# Patient Record
Sex: FEMALE | Race: WHITE | Employment: UNEMPLOYED | URBAN - METROPOLITAN AREA
[De-identification: names, ages, dates, MRNs, and addresses within clinical notes are randomized per-mention and may not be internally consistent; named-entity substitution may affect disease eponyms.]

---

## 2020-10-19 ENCOUNTER — HOSPITAL ENCOUNTER (OUTPATIENT)
Dept: PHYSICAL THERAPY | Age: 35
Discharge: HOME | End: 2020-10-19
Attending: STUDENT IN AN ORGANIZED HEALTH CARE EDUCATION/TRAINING PROGRAM

## 2020-10-19 NOTE — THERAPY EVALUATION
Speech Pathology    Pt arrived for a dysphagia evaluation. Pt reported this date has not been able to swallow solids since she got choked on chicken approximately 6 months ago. She reported on occasion she has been able to consume solids foods but they usually get stuck and won't go up or down. She reported she has to gag or cough to remove them. She reported she has had 3 or 4 EGDs and they have all been normal.  She reported she is consuming Ensures only since she cannot eat solid foods. She reported she does take her medication in applesauce. Per MD orders, pt was scheduled for ST with FEES. However, we do not have the equipment to do FEES with [address] [place] [address]. Noted MBS results completed at [facility] 8/3/2020 were as follows: \"Pt presents with oropharyngeal swallow within normal limits. Pt demonstrated adequate labial receipt, bolus containment, and oral clearance. Mastication was mildly prolonged and AP transit was defined by repetitive lingual movement. This appeared to be behavioral versus physiologic as no oral motor weakness was observed. No episodes of airway compromise observed. No significant pharyngeal residue present with any consistency. Trace retention observed in the proximal esophagus on lateral view. Using images from study, educated pt on results and recommendations. Following completion of study, pt reported right-sided globus sensation in her throat. Showed pt that there was no pharyngeal residue (trace amount of residual in proximal esophagus). Educated pt that her pharyngeal swallow function is within normal limits without any evidence of physiologic abnormality. Recommended pt work on increasing solid food textures to work back toward regular diet. Pt stated she will definitely choke on regular textures, and therefore, will not attempt to eat them. Discussed recommendation for FEES and ENT consult for direct visualization of pharynx. Pt verbalized understanding.  \"    A bedside swallowing evaluation was not completed this date as it was not felt to be indicated due to pt having a MBS that was [facility]. Also, pt reported no difficulties with water and she declined solids due to fear of getting choked. Recommend following up with a FEES as was recommended by Michelle. Called and left Dr. Mariel Selby a message to inform him [facility] does not complete FEES. Pending FEES assessment, pt may benefit from dysphagia therapy. Please re-consult if indicated.     [address] [facility] [address] CCC-SLP

## 2020-10-21 NOTE — PROGRESS NOTES
Speech Pathology  Spoke with Dr. Antonio Cerda this date whom reported he wanted the pt seen for po trials and diet advancement vs for a FEES. Informed him I thought he wanted the FEES. Recommend a FEES since that was recommended after the MBS. Informed him I could f/u after the FEES. He reported he is proceeding with the FEES. JOSEFINA Bonner, CCC-SLP    ADD:  I spoke with pt this date. She reported she was told they are proceeding with FEES. Instructed her to call back after that has been completed and we can complete a bedside and work on po trial advancement. Understanding expressed.     Natividad Bonner [address] CCC-SLP

## 2020-11-30 ENCOUNTER — HOSPITAL ENCOUNTER (OUTPATIENT)
Dept: GENERAL RADIOLOGY | Age: 35
Discharge: HOME | End: 2020-11-30
Attending: STUDENT IN AN ORGANIZED HEALTH CARE EDUCATION/TRAINING PROGRAM
Payer: COMMERCIAL

## 2020-11-30 DIAGNOSIS — R13.12 OROPHARYNGEAL DYSPHAGIA: ICD-10-CM

## 2020-11-30 PROCEDURE — 74011000250 HC RX REV CODE- 250: Performed by: STUDENT IN AN ORGANIZED HEALTH CARE EDUCATION/TRAINING PROGRAM

## 2020-11-30 PROCEDURE — 74011000255 HC RX REV CODE- 255: Performed by: STUDENT IN AN ORGANIZED HEALTH CARE EDUCATION/TRAINING PROGRAM

## 2020-11-30 PROCEDURE — 74220 X-RAY XM ESOPHAGUS 1CNTRST: CPT

## 2020-11-30 RX ADMIN — BARIUM SULFATE 700 MG: 700 TABLET ORAL at 11:32

## 2020-11-30 RX ADMIN — ANTACID/ANTIFLATULENT 4 G: 380; 550; 10; 10 GRANULE, EFFERVESCENT ORAL at 11:31

## 2020-11-30 RX ADMIN — BARIUM SULFATE 355 ML: 0.6 SUSPENSION ORAL at 11:31

## 2020-11-30 RX ADMIN — BARIUM SULFATE 135 ML: 980 POWDER, FOR SUSPENSION ORAL at 11:31

## 2020-12-30 VITALS — HEIGHT: 63 IN | WEIGHT: 160 LBS | BODY MASS INDEX: 28.35 KG/M2

## 2020-12-30 RX ORDER — ETONOGESTREL AND ETHINYL ESTRADIOL 11.7; 2.7 MG/1; MG/1
INSERT, EXTENDED RELEASE VAGINAL
COMMUNITY

## 2020-12-30 NOTE — PERIOP NOTES
Patient verified name and . Order for consent found in EHR and matches case posting; patient verifies procedure. Tonsillectomy, Flexible Esophagoscopy with [facility]. Type 1B surgery, PAT phone assessment complete. Orders received. Labs per surgeon: none  Labs per anesthesia protocol: none    Patient answered medical/surgical history questions at their best of ability. All prior to admission medications documented in [facility]. Patient instructed to take the following medications the day of surgery according to anesthesia guidelines with a small sip of water: flonase and albuterol inhaler (bring). Hold all vitamins 7 days prior to surgery and NSAIDS 5 days prior to surgery. Patient instructed on the following:    > a negative Covid swab result is required to proceed with surgery;  appointments are made by the surgeon office and test should be collected 7 days prior to surgery. The testing center is located at the [address] [place] [address].   > 1 visitor allowed at this time. >Arrive at [facility], time of arrival to be called the day before by 1700  >NPO after midnight including gum, mints, and ice chips  >Responsible adult must drive patient to the hospital, stay during surgery, and patient will need supervision 24 hours after anesthesia  >Use antibacterial soap in shower the night before surgery and on the morning of surgery  >All piercings must be removed prior to arrival.    >Leave all valuables (money and jewelry) at home but bring insurance card and ID on  DOS. >Do not wear make-up, nail polish, lotions, cologne, perfumes, powders, or oil on skin.

## 2021-01-04 ENCOUNTER — HOSPITAL ENCOUNTER (OUTPATIENT)
Dept: SURGERY | Age: 36
Discharge: HOME | End: 2021-01-04

## 2021-01-12 DIAGNOSIS — J35.1 HYPERTROPHY OF TONSILS: ICD-10-CM

## 2021-01-12 DIAGNOSIS — R13.12 OROPHARYNGEAL DYSPHAGIA: Primary | ICD-10-CM

## 2021-01-29 ENCOUNTER — HOSPITAL ENCOUNTER (OUTPATIENT)
Dept: SURGERY | Age: 36
Discharge: HOME | End: 2021-01-29

## 2021-01-29 VITALS — WEIGHT: 160 LBS | HEIGHT: 63 IN | BODY MASS INDEX: 28.35 KG/M2

## 2021-01-29 NOTE — PERIOP NOTES
Patient verified name and . Previous scheduled surgery cancelled due to positive Covid test on . Tonsillectomy, Flexible Esophagoscopy with Vara Guild Dilation    Order for consent found in EHR and matches case posting; patient verifies procedure. Type 1B surgery, PAT phone assessment complete. Orders received. Labs per surgeon: none. Labs per anesthesia protocol: none    Patient COVID test date 21; Patient did show for the appointment. The testing center is located at the [facility] [address]. If appointment is needed-patient provided telephone number of [phone]. Patient answered medical/surgical history questions at their best of ability. All prior to admission medications documented in [facility] Care. Patient instructed to take the following medications the day of surgery according to anesthesia guidelines with a small sip of water: albuterol inhaler (bring) and flonase. Hold all vitamins 7 days prior to surgery and NSAIDS 5 days prior to surgery. Patient instructed on the following:      > 1 medical  is allowed at this time. > Arrive at [facility], time of arrival to be called the day before by 1700  > NPO after midnight including gum, mints, and ice chips  > Responsible adult must drive patient to the hospital, stay during surgery, and patient will need supervision 24 hours after anesthesia  > Use antibacterial soap in shower the night before surgery and on the morning of surgery  > All piercings must be removed prior to arrival.    > Leave all valuables (money and jewelry) at home but bring insurance card and ID on DOS.   > Do not wear make-up, nail polish, lotions, cologne, perfumes, powders, or oil on skin.

## 2021-02-04 ENCOUNTER — ANESTHESIA EVENT (OUTPATIENT)
Dept: SURGERY | Age: 36
End: 2021-02-04
Payer: COMMERCIAL

## 2021-02-05 ENCOUNTER — HOSPITAL ENCOUNTER (OUTPATIENT)
Age: 36
Setting detail: OUTPATIENT SURGERY
Discharge: HOME | End: 2021-02-05
Attending: STUDENT IN AN ORGANIZED HEALTH CARE EDUCATION/TRAINING PROGRAM | Admitting: STUDENT IN AN ORGANIZED HEALTH CARE EDUCATION/TRAINING PROGRAM
Payer: COMMERCIAL

## 2021-02-05 ENCOUNTER — ANESTHESIA (OUTPATIENT)
Dept: SURGERY | Age: 36
End: 2021-02-05
Payer: COMMERCIAL

## 2021-02-05 VITALS
OXYGEN SATURATION: 98 % | DIASTOLIC BLOOD PRESSURE: 72 MMHG | RESPIRATION RATE: 16 BRPM | HEIGHT: 62 IN | HEART RATE: 76 BPM | TEMPERATURE: 98.6 F | WEIGHT: 170 LBS | BODY MASS INDEX: 31.28 KG/M2 | SYSTOLIC BLOOD PRESSURE: 115 MMHG

## 2021-02-05 DIAGNOSIS — J35.1 HYPERTROPHY OF TONSILS: ICD-10-CM

## 2021-02-05 DIAGNOSIS — J35.1 HYPERTROPHY OF TONSILS ALONE: ICD-10-CM

## 2021-02-05 DIAGNOSIS — R13.12 OROPHARYNGEAL DYSPHAGIA: ICD-10-CM

## 2021-02-05 LAB — HCG UR QL: NEGATIVE

## 2021-02-05 PROCEDURE — 43450 DILATE ESOPHAGUS 1/MULT PASS: CPT | Performed by: STUDENT IN AN ORGANIZED HEALTH CARE EDUCATION/TRAINING PROGRAM

## 2021-02-05 PROCEDURE — 77030039425 HC BLD LARYNG TRULITE DISP TELE -A: Performed by: ANESTHESIOLOGY

## 2021-02-05 PROCEDURE — 2709999900 HC NON-CHARGEABLE SUPPLY: Performed by: STUDENT IN AN ORGANIZED HEALTH CARE EDUCATION/TRAINING PROGRAM

## 2021-02-05 PROCEDURE — 43235 EGD DIAGNOSTIC BRUSH WASH: CPT | Performed by: STUDENT IN AN ORGANIZED HEALTH CARE EDUCATION/TRAINING PROGRAM

## 2021-02-05 PROCEDURE — 77030008703 HC TU ET UNCUF COVD -A: Performed by: ANESTHESIOLOGY

## 2021-02-05 PROCEDURE — 74011000250 HC RX REV CODE- 250: Performed by: STUDENT IN AN ORGANIZED HEALTH CARE EDUCATION/TRAINING PROGRAM

## 2021-02-05 PROCEDURE — 74011250636 HC RX REV CODE- 250/636: Performed by: ANESTHESIOLOGY

## 2021-02-05 PROCEDURE — 74011250636 HC RX REV CODE- 250/636: Performed by: NURSE ANESTHETIST, CERTIFIED REGISTERED

## 2021-02-05 PROCEDURE — 74011000250 HC RX REV CODE- 250: Performed by: NURSE ANESTHETIST, CERTIFIED REGISTERED

## 2021-02-05 PROCEDURE — 81025 URINE PREGNANCY TEST: CPT

## 2021-02-05 PROCEDURE — 74011250637 HC RX REV CODE- 250/637: Performed by: ANESTHESIOLOGY

## 2021-02-05 PROCEDURE — 88305 TISSUE EXAM BY PATHOLOGIST: CPT

## 2021-02-05 PROCEDURE — 77030040922 HC BLNKT HYPOTHRM STRY -A: Performed by: ANESTHESIOLOGY

## 2021-02-05 PROCEDURE — 76210000006 HC OR PH I REC 0.5 TO 1 HR: Performed by: STUDENT IN AN ORGANIZED HEALTH CARE EDUCATION/TRAINING PROGRAM

## 2021-02-05 PROCEDURE — 77030012840 HC ELECTRD COAG SUC CNMD -C: Performed by: STUDENT IN AN ORGANIZED HEALTH CARE EDUCATION/TRAINING PROGRAM

## 2021-02-05 PROCEDURE — 77030011267 HC ELECTRD BLD COVD -A: Performed by: STUDENT IN AN ORGANIZED HEALTH CARE EDUCATION/TRAINING PROGRAM

## 2021-02-05 PROCEDURE — 74011250637 HC RX REV CODE- 250/637: Performed by: STUDENT IN AN ORGANIZED HEALTH CARE EDUCATION/TRAINING PROGRAM

## 2021-02-05 PROCEDURE — 77030008477 HC STYL SATN SLP COVD -A: Performed by: ANESTHESIOLOGY

## 2021-02-05 PROCEDURE — 76210000020 HC REC RM PH II FIRST 0.5 HR: Performed by: STUDENT IN AN ORGANIZED HEALTH CARE EDUCATION/TRAINING PROGRAM

## 2021-02-05 PROCEDURE — 42826 REMOVAL OF TONSILS: CPT | Performed by: STUDENT IN AN ORGANIZED HEALTH CARE EDUCATION/TRAINING PROGRAM

## 2021-02-05 PROCEDURE — 76060000033 HC ANESTHESIA 1 TO 1.5 HR: Performed by: STUDENT IN AN ORGANIZED HEALTH CARE EDUCATION/TRAINING PROGRAM

## 2021-02-05 PROCEDURE — 76010000149 HC OR TIME 1 TO 1.5 HR: Performed by: STUDENT IN AN ORGANIZED HEALTH CARE EDUCATION/TRAINING PROGRAM

## 2021-02-05 RX ORDER — LIDOCAINE HYDROCHLORIDE AND EPINEPHRINE 20; 10 MG/ML; UG/ML
INJECTION, SOLUTION INFILTRATION; PERINEURAL AS NEEDED
Status: DISCONTINUED | OUTPATIENT
Start: 2021-02-05 | End: 2021-02-05 | Stop reason: HOSPADM

## 2021-02-05 RX ORDER — ACETAMINOPHEN 500 MG
1000 TABLET ORAL ONCE
Status: DISCONTINUED | OUTPATIENT
Start: 2021-02-05 | End: 2021-02-05 | Stop reason: HOSPADM

## 2021-02-05 RX ORDER — LIDOCAINE HYDROCHLORIDE 10 MG/ML
0.1 INJECTION INFILTRATION; PERINEURAL AS NEEDED
Status: DISCONTINUED | OUTPATIENT
Start: 2021-02-05 | End: 2021-02-05 | Stop reason: HOSPADM

## 2021-02-05 RX ORDER — FENTANYL CITRATE 50 UG/ML
100 INJECTION, SOLUTION INTRAMUSCULAR; INTRAVENOUS AS NEEDED
Status: DISCONTINUED | OUTPATIENT
Start: 2021-02-05 | End: 2021-02-05 | Stop reason: HOSPADM

## 2021-02-05 RX ORDER — PROPOFOL 10 MG/ML
INJECTION, EMULSION INTRAVENOUS AS NEEDED
Status: DISCONTINUED | OUTPATIENT
Start: 2021-02-05 | End: 2021-02-05 | Stop reason: HOSPADM

## 2021-02-05 RX ORDER — ONDANSETRON 2 MG/ML
INJECTION INTRAMUSCULAR; INTRAVENOUS AS NEEDED
Status: DISCONTINUED | OUTPATIENT
Start: 2021-02-05 | End: 2021-02-05 | Stop reason: HOSPADM

## 2021-02-05 RX ORDER — ROCURONIUM BROMIDE 10 MG/ML
INJECTION, SOLUTION INTRAVENOUS AS NEEDED
Status: DISCONTINUED | OUTPATIENT
Start: 2021-02-05 | End: 2021-02-05 | Stop reason: HOSPADM

## 2021-02-05 RX ORDER — APREPITANT 40 MG/1
40 CAPSULE ORAL ONCE
Status: DISCONTINUED | OUTPATIENT
Start: 2021-02-05 | End: 2021-02-05 | Stop reason: HOSPADM

## 2021-02-05 RX ORDER — FENTANYL CITRATE 50 UG/ML
INJECTION, SOLUTION INTRAMUSCULAR; INTRAVENOUS AS NEEDED
Status: DISCONTINUED | OUTPATIENT
Start: 2021-02-05 | End: 2021-02-05 | Stop reason: HOSPADM

## 2021-02-05 RX ORDER — DIPHENHYDRAMINE HYDROCHLORIDE 50 MG/ML
12.5 INJECTION, SOLUTION INTRAMUSCULAR; INTRAVENOUS
Status: DISCONTINUED | OUTPATIENT
Start: 2021-02-05 | End: 2021-02-05 | Stop reason: HOSPADM

## 2021-02-05 RX ORDER — DIPHENHYDRAMINE HYDROCHLORIDE 50 MG/ML
INJECTION, SOLUTION INTRAMUSCULAR; INTRAVENOUS AS NEEDED
Status: DISCONTINUED | OUTPATIENT
Start: 2021-02-05 | End: 2021-02-05 | Stop reason: HOSPADM

## 2021-02-05 RX ORDER — FLUMAZENIL 0.1 MG/ML
0.2 INJECTION INTRAVENOUS
Status: DISCONTINUED | OUTPATIENT
Start: 2021-02-05 | End: 2021-02-05 | Stop reason: HOSPADM

## 2021-02-05 RX ORDER — HYDROMORPHONE HYDROCHLORIDE 2 MG/ML
INJECTION, SOLUTION INTRAMUSCULAR; INTRAVENOUS; SUBCUTANEOUS AS NEEDED
Status: DISCONTINUED | OUTPATIENT
Start: 2021-02-05 | End: 2021-02-05 | Stop reason: HOSPADM

## 2021-02-05 RX ORDER — LIDOCAINE HYDROCHLORIDE 20 MG/ML
INJECTION, SOLUTION EPIDURAL; INFILTRATION; INTRACAUDAL; PERINEURAL AS NEEDED
Status: DISCONTINUED | OUTPATIENT
Start: 2021-02-05 | End: 2021-02-05 | Stop reason: HOSPADM

## 2021-02-05 RX ORDER — MIDAZOLAM HYDROCHLORIDE 1 MG/ML
2 INJECTION, SOLUTION INTRAMUSCULAR; INTRAVENOUS ONCE
Status: COMPLETED | OUTPATIENT
Start: 2021-02-05 | End: 2021-02-05

## 2021-02-05 RX ORDER — OXYCODONE HYDROCHLORIDE 5 MG/1
5 TABLET ORAL
Status: COMPLETED | OUTPATIENT
Start: 2021-02-05 | End: 2021-02-05

## 2021-02-05 RX ORDER — NALOXONE HYDROCHLORIDE 0.4 MG/ML
0.1 INJECTION, SOLUTION INTRAMUSCULAR; INTRAVENOUS; SUBCUTANEOUS AS NEEDED
Status: DISCONTINUED | OUTPATIENT
Start: 2021-02-05 | End: 2021-02-05 | Stop reason: HOSPADM

## 2021-02-05 RX ORDER — HYDROMORPHONE HYDROCHLORIDE 2 MG/ML
0.5 INJECTION, SOLUTION INTRAMUSCULAR; INTRAVENOUS; SUBCUTANEOUS
Status: DISCONTINUED | OUTPATIENT
Start: 2021-02-05 | End: 2021-02-05 | Stop reason: HOSPADM

## 2021-02-05 RX ORDER — GLYCOPYRROLATE 0.2 MG/ML
INJECTION INTRAMUSCULAR; INTRAVENOUS AS NEEDED
Status: DISCONTINUED | OUTPATIENT
Start: 2021-02-05 | End: 2021-02-05 | Stop reason: HOSPADM

## 2021-02-05 RX ORDER — NEOSTIGMINE METHYLSULFATE 1 MG/ML
INJECTION, SOLUTION INTRAVENOUS AS NEEDED
Status: DISCONTINUED | OUTPATIENT
Start: 2021-02-05 | End: 2021-02-05 | Stop reason: HOSPADM

## 2021-02-05 RX ORDER — OXYMETAZOLINE HCL 0.05 %
SPRAY, NON-AEROSOL (ML) NASAL AS NEEDED
Status: DISCONTINUED | OUTPATIENT
Start: 2021-02-05 | End: 2021-02-05 | Stop reason: HOSPADM

## 2021-02-05 RX ORDER — SODIUM CHLORIDE, SODIUM LACTATE, POTASSIUM CHLORIDE, CALCIUM CHLORIDE 600; 310; 30; 20 MG/100ML; MG/100ML; MG/100ML; MG/100ML
75 INJECTION, SOLUTION INTRAVENOUS CONTINUOUS
Status: DISCONTINUED | OUTPATIENT
Start: 2021-02-05 | End: 2021-02-05 | Stop reason: HOSPADM

## 2021-02-05 RX ORDER — DEXAMETHASONE SODIUM PHOSPHATE 4 MG/ML
INJECTION, SOLUTION INTRA-ARTICULAR; INTRALESIONAL; INTRAMUSCULAR; INTRAVENOUS; SOFT TISSUE AS NEEDED
Status: DISCONTINUED | OUTPATIENT
Start: 2021-02-05 | End: 2021-02-05 | Stop reason: HOSPADM

## 2021-02-05 RX ORDER — SODIUM CHLORIDE, SODIUM LACTATE, POTASSIUM CHLORIDE, CALCIUM CHLORIDE 600; 310; 30; 20 MG/100ML; MG/100ML; MG/100ML; MG/100ML
100 INJECTION, SOLUTION INTRAVENOUS CONTINUOUS
Status: DISCONTINUED | OUTPATIENT
Start: 2021-02-05 | End: 2021-02-05 | Stop reason: HOSPADM

## 2021-02-05 RX ADMIN — HYDROMORPHONE HYDROCHLORIDE 1 MG: 2 INJECTION INTRAMUSCULAR; INTRAVENOUS; SUBCUTANEOUS at 10:26

## 2021-02-05 RX ADMIN — DEXAMETHASONE SODIUM PHOSPHATE 10 MG: 4 INJECTION, SOLUTION INTRAMUSCULAR; INTRAVENOUS at 10:28

## 2021-02-05 RX ADMIN — OXYCODONE 5 MG: 5 TABLET ORAL at 12:10

## 2021-02-05 RX ADMIN — HYDROMORPHONE HYDROCHLORIDE 0.5 MG: 2 INJECTION INTRAMUSCULAR; INTRAVENOUS; SUBCUTANEOUS at 11:27

## 2021-02-05 RX ADMIN — Medication 3 MG: at 11:09

## 2021-02-05 RX ADMIN — HYDROMORPHONE HYDROCHLORIDE 0.5 MG: 2 INJECTION INTRAMUSCULAR; INTRAVENOUS; SUBCUTANEOUS at 10:58

## 2021-02-05 RX ADMIN — FENTANYL CITRATE 100 MCG: 50 INJECTION INTRAMUSCULAR; INTRAVENOUS at 10:16

## 2021-02-05 RX ADMIN — HYDROMORPHONE HYDROCHLORIDE 0.5 MG: 2 INJECTION INTRAMUSCULAR; INTRAVENOUS; SUBCUTANEOUS at 11:22

## 2021-02-05 RX ADMIN — ROCURONIUM BROMIDE 30 MG: 10 INJECTION, SOLUTION INTRAVENOUS at 10:17

## 2021-02-05 RX ADMIN — SODIUM CHLORIDE, SODIUM LACTATE, POTASSIUM CHLORIDE, AND CALCIUM CHLORIDE 100 ML/HR: 600; 310; 30; 20 INJECTION, SOLUTION INTRAVENOUS at 08:39

## 2021-02-05 RX ADMIN — PROPOFOL 200 MG: 10 INJECTION, EMULSION INTRAVENOUS at 10:16

## 2021-02-05 RX ADMIN — FENTANYL CITRATE 100 MCG: 50 INJECTION INTRAMUSCULAR; INTRAVENOUS at 10:25

## 2021-02-05 RX ADMIN — HYDROMORPHONE HYDROCHLORIDE 0.5 MG: 2 INJECTION INTRAMUSCULAR; INTRAVENOUS; SUBCUTANEOUS at 11:47

## 2021-02-05 RX ADMIN — GLYCOPYRROLATE 0.4 MG: 0.2 INJECTION, SOLUTION INTRAMUSCULAR; INTRAVENOUS at 11:09

## 2021-02-05 RX ADMIN — ONDANSETRON 4 MG: 2 INJECTION INTRAMUSCULAR; INTRAVENOUS at 11:09

## 2021-02-05 RX ADMIN — MIDAZOLAM 2 MG: 1 INJECTION INTRAMUSCULAR; INTRAVENOUS at 08:39

## 2021-02-05 RX ADMIN — LIDOCAINE HYDROCHLORIDE 80 MG: 20 INJECTION, SOLUTION EPIDURAL; INFILTRATION; INTRACAUDAL; PERINEURAL at 10:16

## 2021-02-05 RX ADMIN — DIPHENHYDRAMINE HYDROCHLORIDE 25 MG: 50 INJECTION, SOLUTION INTRAMUSCULAR; INTRAVENOUS at 10:31

## 2021-02-05 NOTE — PROCEDURES
Operative Report    Patient: Anton Matamoros MRN: [id number]  SSN: [id number]    YOB: 1985  Age: 37 y.o. Sex: female       Date of Surgery: 2/5/2021     Preoperative Diagnosis: Oropharyngeal dysphagia [R13.12]  Tonsillar hypertrophy [J35.1]     Postoperative Diagnosis: Oropharyngeal dysphagia [R13.12]  Tonsillar hypertrophy [J35.1]     Surgeon(s) and Role:     * Alissa Summers MD - Primary    Anesthesia: General     Procedure: Procedure(s):  -TONSILLECTOMY  -ESOPHAGOSCOPY FLEXIBLE WITH Ly Valdes   -EGD    Findings: 2+ endophytic tonsils bilaterally, slightly tight at the region of the cricopharyngeus, esophagus dilated to 62 [place], no other GI abnormalities noted    Procedure in Detail: Patient was identified in preoperative holding area. Informed consent was obtained. The patient was brought back to the operating room suite laid supine on the operating room table. Upper and lower extremity pressure points were padded. SCDs were applied. Anesthesia was induced and the patient was intubated out complication. A preoperative timeout was performed. The patient was turned on degrees counterclockwise away from anesthesia. A tooth guard was inserted. Shoulder roll was placed. #3 Jalaine Chars was used to expose the esophageal inlet. The esophagoscope was then easily passed into the stomach. The stomach was examined retroflexed views were obtained in the lower esophageal sphincter appeared normal.  The duodenum was entered and there were no abnormalities noted. There were no other abnormalities of the stomach noted. Stomach was deflated and the esophagus was examined on the way out including the Z-line which did not show any significant abnormalities. Next I used #3 Jalaine Chars again to expose the esophageal inlet and then passed a 42 [place]sy Fox dilator. I left this place for a minute and then removed it.   I then passed a 62 [place] Fox dilator and left in place for a minute. After removing it I then reexposed the esophagus with the #3 Clois Alvares and passed the EGD scope again. The scope was passed into the stomach which was again suctioned out. The esophagus was examined on the way out. Final superficial tearing at the esophageal inlet that was not full-thickness. The EGD scope was then removed. The tooth guard was removed. I then turned to performing a tonsillectomy and Wyline Crimes was inserted. Rubber catheters passed through the nose out the mouth in order to elevate soft palate. The mouthgag was placed in suspension. I turned to the right tonsil first and grasped with an Allis and retracted medially. Dissected the tonsil free of the anterior posterior tonsillar pillars using the Bovie electrocautery. This was marked with a suture and handed off as a specimen. A tonsil soaked in Afrin were placed in the tonsil bed. Attention was then turned to the left side which was also grasped with an Allis retracted medially dissected free of the anterior posterior tonsillar pillars using the Bovie electrocautery. The specimen was handed off. The oropharynx was irrigated out. Hemostasis was achieved using the suction Bovie electrocautery. The tonsil balls were removed. Patient was then let out of suspension and then resuspended and the tonsillar fossa were examined and there was excellent hemostasis. The esophagus was suctioned out. The patient was turned back to anesthesia awoken taken to PACU in stable condition. Estimated Blood Loss: 25 cc    Tourniquet Time: * No tourniquets in log *      Implants: * No implants in log *            Specimens:   ID Type Source Tests Collected by Time Destination   1 : Tonsils Preservative Tonsil  Guzman Blankenship MD 2/5/2021 1051 Pathology           Drains: None                Complications: None    Counts: Sponge and needle counts were correct times two.     Signed By:  Nini Phillip MD     February 5, 2021

## 2021-02-05 NOTE — ANESTHESIA POSTPROCEDURE EVALUATION
Procedure(s):  TONSILLECTOMY  ESOPHAGOSCOPY FLEXIBLE WITH FOY DILATION.     general    Anesthesia Post Evaluation      Multimodal analgesia: multimodal analgesia used between 6 hours prior to anesthesia start to PACU discharge  Patient location during evaluation: PACU  Patient participation: complete - patient participated  Level of consciousness: awake and alert  Pain management: adequate  Airway patency: patent  Anesthetic complications: no  Cardiovascular status: acceptable  Respiratory status: acceptable  Hydration status: acceptable  Post anesthesia nausea and vomiting:  controlled  Final Post Anesthesia Temperature Assessment:  Normothermia (36.0-37.5 degrees C)      INITIAL Post-op Vital signs:   Vitals Value Taken Time   /72 02/05/21 1220   Temp 37 °C (98.6 °F) 02/05/21 1126   Pulse 76 02/05/21 1220   Resp 16 02/05/21 1220   SpO2 98 % 02/05/21 1220

## 2021-02-05 NOTE — ANESTHESIA PREPROCEDURE EVALUATION
Relevant Problems   No relevant active problems       Anesthetic History   No history of anesthetic complications            Review of Systems / Medical History  Patient summary reviewed and pertinent labs reviewed    Pulmonary            Asthma : well controlled    Comments: Covid back in December - fully recovered and no current complaints    Neuro/Psych         Psychiatric history (anxiety )    Comments: ?seizure vs severe migraine x1 - sounds like she passed out for unknown reason - not on seizure meds and no issues since Cardiovascular  Within defined limits                Exercise tolerance: >4 METS     GI/Hepatic/Renal     GERD           Endo/Other        Obesity     Other Findings            Physical Exam    Airway  Mallampati: II  TM Distance: 4 - 6 cm  Neck ROM: normal range of motion   Mouth opening: Normal     Cardiovascular  Regular rate and rhythm,  S1 and S2 normal,  no murmur, click, rub, or gallop             Dental  No notable dental hx       Pulmonary  Breath sounds clear to auscultation               Abdominal  GI exam deferred       Other Findings            Anesthetic Plan    ASA: 2  Anesthesia type: general  ETT        Induction: Intravenous  Anesthetic plan and risks discussed with: Patient and Mother

## 2021-02-05 NOTE — DISCHARGE INSTRUCTIONS
Tonsillectomy: What to Expect at Home  Your Recovery  A tonsillectomy is surgery to remove the tonsils. Sometimes the adenoids are removed during the same surgery. The tonsils and adenoids are in the throat. Your doctor did the surgery through your mouth. Most adults have a lot of throat pain for 1 to 2 weeks or longer. The pain may get worse before it gets better. The pain in your throat can also make your ears hurt. You may have good days and bad days. Most people find that they have the most pain in the first 8 days. You probably will feel tired for 1 to 2 weeks. You may have bad breath for up to 2 weeks. You may be able to go back to work or your usual routine in 1 to 2 weeks. There will be a white coating in your throat where the tonsils were. The coating is like a scab. It usually starts to come off in 5 to 10 days. It is usually gone in 10 to 16 days. You may see some blood in your spit as the coating comes off. After surgery, you may snore or breathe through your mouth at night. This usually gets better 1 to 2 weeks after surgery. Mouth breathing can make your mouth and throat dry or sore. Place a humidifier by your bed when you sleep. This may make it easier for you to breathe. Follow the directions for cleaning the machine. At first, your voice may sound different. Your voice probably will get back to normal in 2 to 6 weeks. It's common for people to lose weight after this surgery. That's because it can hurt to swallow food at first. As long as you drink plenty of liquids, this is okay. You will probably gain the weight back when you can eat normally again. This care sheet gives you a general idea about how long it will take for you to recover. But each person recovers at a different pace. Follow the steps below to get better as quickly as possible. How can you care for yourself at home? Activity    · Rest when you feel tired.  Getting enough sleep will help you recover.     · Try to walk each day. Start by walking a little more than you did the day before. Bit by bit, increase the amount you walk. Walking boosts blood flow and helps prevent pneumonia and constipation.     · Avoid strenuous activities, such as bicycle riding, jogging, weight lifting, or aerobic exercise, for 2 weeks or until your doctor says it is okay.     · For 2 weeks, avoid lifting anything that would make you strain. This may include a child, heavy grocery bags and milk containers, a heavy briefcase or backpack, cat litter or dog food bags, or a vacuum .     · Avoid dirt, dust, and heat for 2 weeks after surgery. These things can irritate your throat.     · For about 1 week, try to avoid crowds or people who you know have a cold or the flu. This can help prevent you from getting an infection.     · You may bathe as usual.     · Ask your doctor when you can drive again.     · You will probably need to take 1 to 2 weeks off from work. It depends on the type of work you do and how you feel. Diet    · Drink plenty of fluids to avoid becoming dehydrated.     · If it is painful to swallow, start out with Popsicles, ice cream, or cold or room-temperature drinks. Do not eat or drink red food items, such as red juice or red Jell-O. The color may make you think you are bleeding. Avoid hot drinks, soda pop, orange or tomato juice, and other acidic foods that can sting the throat. These may make throat pain worse and cause bleeding.     · For 2 weeks, choose soft foods like pudding, yogurt, canned or cooked fruit, scrambled eggs, and mashed potatoes. Avoid eating hard or scratchy foods like chips or raw vegetables.     · You may notice that your bowel movements are not regular right after your surgery. This is common. Try to avoid constipation and straining with bowel movements. You may want to take a fiber supplement every day. If you have not had a bowel movement after a couple of days, ask your doctor about taking a mild laxative. Medicines    · Your doctor will tell you if and when you can restart your medicines. He or she will also give you instructions about taking any new medicines.     · If you take aspirin or some other blood thinner, ask your doctor if and when to start taking it again. Make sure that you understand exactly what your doctor wants you to do.     · Be safe with medicines. Take pain medicines exactly as directed. ? If the doctor gave you a prescription medicine for pain, take it as prescribed. ? If you are not taking a prescription pain medicine, ask your doctor if you can take an over-the-counter medicine.     · If you think your pain medicine is making you sick to your stomach:  ? Take your pain medicine after meals (unless your doctor has told you not to). ? Ask your doctor for a different pain medicine.     · If your doctor prescribed antibiotics, take them as directed. Do not stop taking them just because you feel better. You need to take the full course of antibiotics. Follow-up care is a key part of your treatment and safety. Be sure to make and go to all appointments, and call your doctor if you are having problems. It's also a good idea to know your test results and keep a list of the medicines you take. When should you call for help? Call 911 anytime you think you may need emergency care. For example, call if:    · You passed out (lost consciousness).     · You have severe trouble breathing.     · You have a lot of bleeding. Call your doctor now or seek immediate medical care if:    · You have signs of infection, such as:  ? Increased pain, swelling, warmth, or redness. ? Red streaks leading from the area. ? Pus draining from the area. ? A fever.     · You are bleeding.     · You are too sick to your stomach to drink any fluids.     · You cannot keep down fluids.     · You have new pain, or your pain gets worse.    Watch closely for changes in your health, and be sure to contact your doctor if:    · You do not get better as expected. Where can you learn more? Go to http://www.gray.com/  Enter X297 in the search box to learn more about \"Tonsillectomy: What to Expect at Home. \"  Current as of: April 15, 2020               Content Version: 12.6  © 1741-2627 [employer]. Care instructions adapted under license by [employer] (which disclaims liability or warranty for this information). If you have questions about a medical condition or this instruction, always ask your healthcare professional. [address] any warranty or liability for your use of this information. After general anesthesia or intravenous sedation, for 24 hours or while taking prescription Narcotics:  · Limit your activities  · A responsible adult needs to be with you for the next 24 hours  · Do not drive and operate hazardous machinery  · Do not make important personal or business decisions  · Do not drink alcoholic beverages  · If you have not urinated within 8 hours after discharge, and you are experiencing discomfort from urinary retention, please go to the nearest ED. · If you have sleep apnea and have a CPAP machine, please use it for all naps and sleeping. · Please use caution when taking narcotics and any of your home medications that may cause drowsiness. *  Please give a list of your current medications to your Primary Care Provider. *  Please update this list whenever your medications are discontinued, doses are      changed, or new medications (including over-the-counter products) are added. *  Please carry medication information at all times in case of emergency situations. These are general instructions for a healthy lifestyle:  No smoking/ No tobacco products/ Avoid exposure to second hand smoke  Surgeon General's Warning:  Quitting smoking now greatly reduces serious risk to your health.   Obesity, smoking, and sedentary lifestyle greatly increases your risk for illness  A healthy diet, regular physical exercise & weight monitoring are important for maintaining a healthy lifestyle    You may be retaining fluid if you have a history of heart failure or if you experience any of the following symptoms:  Weight gain of 3 pounds or more overnight or 5 pounds in a week, increased swelling in our hands or feet or shortness of breath while lying flat in bed. Please call your doctor as soon as you notice any of these symptoms; do not wait until your next office visit.

## 2021-03-27 ENCOUNTER — TRANSCRIBE ORDER (OUTPATIENT)
Dept: SCHEDULING | Age: 36
End: 2021-03-27

## 2021-03-27 DIAGNOSIS — M54.42 LUMBAGO WITH SCIATICA, LEFT SIDE: Primary | ICD-10-CM

## 2021-06-24 ENCOUNTER — APPOINTMENT (OUTPATIENT)
Dept: PHYSICAL THERAPY | Age: 36
End: 2021-06-24

## 2021-07-01 ENCOUNTER — HOSPITAL ENCOUNTER (OUTPATIENT)
Dept: PHYSICAL THERAPY | Age: 36
Discharge: HOME | End: 2021-07-01
Payer: COMMERCIAL

## 2021-07-01 PROCEDURE — 97161 PT EVAL LOW COMPLEX 20 MIN: CPT

## 2021-07-01 NOTE — PROGRESS NOTES
Ramakrishna Chin  : 1985  Primary: [facility]  Secondary:  [address] at [facility]  [address], [address], [place]o webber, [address] [place] [address] Street  [phone]   [phone]      OUTPATIENT PHYSICAL THERAPY: Daily Treatment Note 2021    ICD-10: Treatment Diagnosis: lumbago with sciatica, left side (M54.42)                Treatment Diagnosis 2: L knee pain (M25.562)                Treatment Diagnosis 3: other abnormalities of gait and mobility (R26.89)   Precautions: anxiety  Allergies: Morphine and Pantoprazole   TREATMENT PLAN:  Effective Dates: 2021 TO 2021 (60 days). Frequency/Duration: 2 times a week for 60 Day(s) MEDICAL/REFERRING DIAGNOSIS:  Lumbago with sciatica, left side [M54.42]   DATE OF ONSET: patient had a fall in 2018 injuring her back and her knee. Current episode of pain started about 6 months ago  REFERRING PHYSICIAN: Santos Brwone MD MD Orders: Evaluate and Treat   Return MD Appointment: 2021     Pre-treatment Symptoms/Complaints:  Patient with low back and L knee pain hindering her mobility. Pain: Initial: Pain Intensity 1: 4  Pain Location 1: Back  Pain Orientation 1: Lower, Left  Pain Intensity 2: 5  Pain Location 2: Knee  Pain Orientation 2: Left  Post Session:  10 low back- sore   Medications Last Reviewed:  2021  Updated Objective Findings:  See evaluation note from today   TREATMENT:   THERAPEUTIC EXERCISE: ( minutes):  Exercises per grid below to improve mobility, strength, balance and coordination. Required moderate visual, verbal and manual cues to promote proper body alignment, promote proper body posture and promote proper body mechanics. Progressed resistance, range, repetitions and complexity of movement as indicated.      Date:  2021 Date:   Date:     Activity/Exercise Parameters Parameters Parameters   Transverse Abdominus (TA) contraction HEP     Hamstring stretch HEP     Piriformis stretch HEP Scapular retraction HEP                         Time spent with patient reviewing proper muscle recruitment and technique with exercises. MANUAL THERAPY: ( minutes): Joint mobilization, Soft tissue mobilization and Manipulation was utilized and necessary because of the patient's restricted joint motion, painful spasm, loss of articular motion and restricted motion of soft tissue   Supine long axis distraction grade V to L LE with audible cavitation to correct innominate rotation    MODALITIES: (0 minutes):      none today     HEP: As above; handouts given to patient for all exercises. Treatment/Session Summary:    · Response to Treatment:  Patient with good understanding of HEP and plan of care. · Communication/Consultation:  Spoke with patient about plan of care, safety at home, progression of therapy, and importance of HEP  · Equipment provided today:  Patient given handout with above exercises for home  · Recommendations/Intent for next treatment session: Next visit will focus on pain control, manual therapy and modalities as needed, progression of postural and core stability exercises as tolerated.     Total Treatment Billable Duration:  45 minutes: evaluation + HEP  PT Patient Time In/Time Out  Time In: 4174  Time Out: 2892 [facility], PT

## 2021-07-01 NOTE — THERAPY EVALUATION
Matt Dumont  : 1985  Primary: [facility]  Secondary:  [address] at [facility]  [address] [place], [address] [place] [address] Avenue, [place] webber, [address] [place] [address]  [phone]   [phone]       OUTPATIENT PHYSICAL THERAPY:Initial Assessment 2021    ICD-10: Treatment Diagnosis: lumbago with sciatica, left side (M54.42)                Treatment Diagnosis 2: L knee pain (M25.562)                Treatment Diagnosis 3: other abnormalities of gait and mobility (R26.89)   Precautions: anxiety  Allergies: Morphine and Pantoprazole   TREATMENT PLAN:  Effective Dates: 2021 TO 2021 (60 days). Frequency/Duration: 2 times a week for 60 Day(s) MEDICAL/REFERRING DIAGNOSIS:  Lumbago with sciatica, left side [M54.42]   DATE OF ONSET: patient had a fall in 2018 injuring her back and her knee. Current episode of pain started about 6 months ago  REFERRING PHYSICIAN: Theodore Hoffman MD MD Orders: Evaluate and Treat   Return MD Appointment: 2021     INITIAL ASSESSMENT:  Ms. Debra Arias is a 37 y.o. female presenting to physical therapy with complaints of L sided low back and knee pain which is chronic in nature, but the current episode began about 6 months ago. She reports a fall down the stairs in 2018 where she tore her L meniscus and had injections and physical therapy. She reports her L knee buckling and having some falls over the past year. Patient with complaints of L sided low back pain that seems to run into her thigh down to her knee and up to her mid back. She believes that some of her pain is due to needing a breast reduction. She is eager to reduce her pain, sleep without waking at night with pain, perform housework, and return to exercise for weight management.  Patient presents with increased pain, decreased strength, decreased ROM, decreased flexibility, impaired gait, impaired posture, impaired overhead reach, impaired transfer ability, decreased activity tolerance, and overall impaired functional mobility. Patient is a good candidate for skilled physical therapy interventions to include manual therapy, therapeutic exercise, balance training, gait training, transfer training, postural re-education, body mechanics training, and pain modalities as needed. PROBLEM LIST (Impacting functional limitations):  1. Decreased Strength  2. Decreased ADL/Functional Activities  3. Decreased Transfer Abilities  4. Decreased Ambulation Ability/Technique  5. Decreased Balance  6. Increased Pain  7. Decreased Activity Tolerance  8. Decreased Pacing Skills  9. Decreased Work Simplification/Energy Conservation Techniques  10. Decreased Flexibility/Joint Mobility  11. Edema/Girth INTERVENTIONS PLANNED: (Treatment may consist of any combination of the following)  1. Balance Exercise  2. Bed Mobility  3. Cold  4. Cryotherapy  5. Electrical Stimulation  6. Family Education  7. Gait Training  8. Heat  9. Home Exercise Program (HEP)  10. Manual Therapy  11. Neuromuscular Re-education/Strengthening  12. Range of Motion (ROM)  13. Therapeutic Activites  14. Therapeutic Exercise/Strengthening  15. Transfer Training  16. Ultrasound (US)     GOALS: (Goals have been discussed and agreed upon with patient.)  Short-Term Functional Goals: Time Frame: 7/1/2021 to 7/30/2021  1. Patient demonstrates independence with home exercise program without verbal cueing provided by therapist.   2. Patient will report no more than 3/10 low back pain at rest in order to demonstrate improved self pain control and tolerance. 3. Patient will be educated in and demonstrate improved upright posture including decreased anterior head and shoulders to decreased strain on the spine. 4. Patient will be educated in and demonstrate proper squat lift technique in order to reduce stress on bilateral LE and lumbar spine, improve safety, and reduce risk of injury.   5. Patient will be educated in the use of pillows and different positions for sleeping in order to improve comfort and sleep. Discharge Goals: Time Frame: 7/1/2021 to 8/31/2021  1. Patient will improve gross L LE strength to at least 4+/5 in order to improve safety with ambulation and transfers. 2. Patient will report waking no more than 2 times a week due to back pain in order to improve sleeping pattern for overall health and wellness. 3. Patient will report no more than 5/10 back pain with mopping/ lifting activities in order to return to prior independence with house work. 4. Patient will be able to return to a light gym program with no complaints of back pain in order to improve health and weight management. 5. Patient will report minimal to no L knee buckling in order to demonstrate improved stability and safety. 6. Patient will improve Modified Oswestry Scale score to 12/50 from 15/50. Outcome Measure: Tool Used: Modified Oswestry Low Back Pain Questionnaire  Score:  Initial: 15/50  Most Recent: X/50 (Date: -- )   Interpretation of Score: Each section is scored on a 0-5 scale, 5 representing the greatest disability. The scores of each section are added together for a total score of 50. Ambulatory/Rehab Services H2 Model Falls Risk Assessment   Risk Factors:       (1)  Any administered benzodiazepines       (5)  History of Recent Falls [w/in 3 months] Ability to Rise from Chair:       (1)  Pushes up, successful in one attempt   Falls Prevention Plan:       No modifications necessary   Total: (5 or greater = High Risk): 7   ©2010 [facility] [employer]. All Rights Reserved. [facility] [employer] Patent #[id number].  Federal Law prohibits the replication, distribution or use without written permission from [employer] Necessity:   · Patient is expected to demonstrate progress in strength, range of motion, balance, coordination and functional technique to increase independence with self care and house work and improve safety during ambulation, transfers, and stairs. · Skilled intervention continues to be required due to low back pain with L LE symptoms hindering functional mobility. Reason for Services/Other Comments:  · Patient continues to require skilled intervention due to low back and L knee pain hindering mobility and quality of life. Total Evaluation Duration: 45 minutes evaluation + HEP    Rehabilitation Potential For Stated Goals: Good  Regarding Britney Stahl's therapy, I certify that the treatment plan above will be carried out by a therapist or under their direction. Thank you for this referral,  Romel Colvin, PT     Referring Physician Signature: Vijay Carmichael MD _______________________________ Date _____________             PAIN/SUBJECTIVE:    Initial: Pain Intensity 1: 4  Pain Location 1: Back  Pain Orientation 1: Lower, Left  Pain Intensity 2: 5  Pain Location 2: Knee  Pain Orientation 2: Left  Post Session:  5/10 low back- sore    HISTORY:    History of Injury/Illness (Reason for Referral):  Ms. Arabella Alcazar is a 37 y.o. female presenting to physical therapy with complaints of L sided low back and knee pain which is chronic in nature, but the current episode began about 6 months ago. She reports a fall down the stairs in 2018 where she tore her L meniscus and had injections and physical therapy. She reports her L knee buckling and having some falls over the past year. Patient with complaints of L sided low back pain that seems to run into her thigh down to her knee and up to her mid back. She believes that some of her pain is due to needing a breast reduction. She is eager to reduce her pain, sleep without waking at night with pain, perform housework, and return to exercise for weight management.  Patient presents with increased pain, decreased strength, decreased ROM, decreased flexibility, impaired gait, impaired posture, impaired overhead reach, impaired transfer ability, decreased activity tolerance, and overall impaired functional mobility. Past Medical History/Comorbidities:   Ms. Shayan Maloney  has a past medical history of Anxiety, Asthma, GERD (gastroesophageal reflux disease), Reflux gastritis, and Seizures ([facility] [address] (2019). Ms. Shayan Maloney  has a past surgical history that includes hx appendectomy; hx dilation and curettage; and hx heent. Social History/Living Environment:     Patient lives in a private residence with her fiance and children. She has a flight of stairs up to her kids rooms. Prior Level of Function/Work/Activity:  Patient not currently working  Dominant Side:         RIGHT    Current Medications:        Current Outpatient Medications:     ethinyl estradiol-etonogestrel (NuvaRing) 0.12-0.015 mg/24 hr vaginal ring, by Intravaginal route., Disp: , Rfl:     ondansetron (Zofran ODT) 4 mg disintegrating tablet, Take 1 Tab by mouth every eight (8) hours as needed for Nausea or Vomiting., Disp: 8 Tab, Rfl: 0    prednisoLONE (ORAPRED) 15 mg/5 mL (3 mg/mL) solution, Take 5ml by mouth every morning for 5 days, Disp: 30 mL, Rfl: 0    OTHER, Suck on Lollipop 20-30 seconds once every 2-3 hours, Disp: 1 UNSPECIFIED, Rfl: 2    albuterol (PROVENTIL HFA, VENTOLIN HFA, PROAIR HFA) 90 mcg/actuation inhaler, INHALE 1 PUFF BY MOUTH EVERY 4 HOURS AS NEEDED, Disp: , Rfl:     fluticasone propionate (FLONASE) 50 mcg/actuation nasal spray, SPRAY 1 SPRAY INTO EACH NOSTRIL TWICE A DAY, Disp: , Rfl:     temazepam (RESTORIL) 30 mg capsule, TAKE 1 CAPSULE BY MOUTH EVERY DAY AT BEDTIME AS NEEDED, Disp: , Rfl:     LORazepam (ATIVAN) 1 mg tablet, Take 1 mg by mouth as needed. , Disp: , Rfl:     lansoprazole (PREVACID) 30 mg capsule, Take 30 mg by mouth two (2) times a day., Disp: , Rfl:     Date Last Reviewed:  7/1/2021    Number of Personal Factors/Comorbidities that affect the Plan of Care:  Based on chronicity of pain and co-morbidities 1-2: MODERATE COMPLEXITY    EXAMINATION:    Patient denies any LE paresthesia.  Patient denies any increase of symptoms with cough, sneeze or valsalva. Patient denies any saddle paresthesia or bowel/bladder deficits. Observation/Orthostatic Postural Assessment:          Patient with wide base of support, decreased weight shift to L LE, L knee hyperextension, L iliac crest lower than R in standing, mild genu valgus bilaterally. Patient with small frame. Forward head and shoulders with increased thoracic kyphosis, possibly exacerbated the weigh of her larger chest.  Palpation:          Moderate tenderness to palpation of medial L knee. Significant tenderness to palpation of L PSIS, sacrum, and gluteals. Supine leg length assessment with L leg longer than R and possible posterior innominate  ROM:    AROM (degrees)   Lumbar Flexion 60   Lumbar Extension 20     Strength: Motion Tested Left   (*/5) Right  (*/5)   Hip Flexion 4+ 5   Hip Abduction 4- 4+   Knee Extension 4 5   Ankle Dorsiflexion 5 5   Gross core strength 3/5 as observed with transfers and transverse abdominus contraction   Special Tests:          Neural tension tests: Passive straight leg raise (SLR) test is negative bilaterally. Crossed SLR test is negative bilaterally. Slump test is negative bilaterally. Lumbar traction negative for change in symptoms. Supine leg length assessment with L LE longer and possible posterior innominate. Passive Accessory Motion:         Moderate to significant limitation with posterior to anterior mobilization of lumbar spine and sacrum. Neurological Screen:              Myotomes: Key muscle strength testing through bilateral LE is [facility]. Dermatomes: Sensation to light touch for bilateral LE is intact from L1 to S2. Reflexes: Patellar (L3/ L4): 2+ bilaterally                 Achilles (S1/ S2): 2+ bilaterally   Abnormal reflexes: Clonus: negative bilaterally  Functional Mobility:         Gait/Ambulation:  Slow cora, forward trunk lean, decreased stance time on L. Reports L knee buckling at times. Transfers: Moderate use of bilateral UE for sit to stand transfers. Stairs:  Reports doing stairs one at a time at home. Balance:          Sitting balance intact. Standing balance limited. Body Structures Involved:  1. Nerves  2. Bones  3. Joints  4. Muscles  5. Ligaments Body Functions Affected:  1. Sensory/Pain  2. Neuromusculoskeletal  3. Movement Related Activities and Participation Affected:  1. General Tasks and Demands  2. Mobility  3.  Domestic Life    Number of elements (examined above) that affect the Plan of Care: 1-2: LOW COMPLEXITY    CLINICAL PRESENTATION:    Presentation:   Stable and uncomplicated: LOW COMPLEXITY    CLINICAL DECISION MAKING:    Use of outcome tool(s) and clinical judgement create a POC that gives a: Questionable prediction of patient's progress: MODERATE COMPLEXITY

## 2021-07-22 ENCOUNTER — HOSPITAL ENCOUNTER (OUTPATIENT)
Dept: PHYSICAL THERAPY | Age: 36
Discharge: HOME | End: 2021-07-22
Payer: COMMERCIAL

## 2021-07-22 PROCEDURE — 97110 THERAPEUTIC EXERCISES: CPT

## 2021-07-22 NOTE — PROGRESS NOTES
Girish Cardenas  : 1985  Primary: [facility]  Secondary:  [facility] at [facility]  [address], [address], [place], [address]  [phone]   [phone]      OUTPATIENT PHYSICAL THERAPY: Daily Treatment Note 2021    ICD-10: Treatment Diagnosis: lumbago with sciatica, left side (M54.42)                Treatment Diagnosis 2: L knee pain (M25.562)                Treatment Diagnosis 3: other abnormalities of gait and mobility (R26.89)   Precautions: anxiety  Allergies: Morphine and Pantoprazole   TREATMENT PLAN:  Effective Dates: 2021 TO 2021 (60 days). Frequency/Duration: 2 times a week for 60 Day(s) MEDICAL/REFERRING DIAGNOSIS:  Lumbago with sciatica, left side [M54.42]   DATE OF ONSET: patient had a fall in 2018 injuring her back and her knee. Current episode of pain started about 6 months ago  REFERRING PHYSICIAN: Mary Hansen MD MD Orders: Evaluate and Treat   Return MD Appointment: 2021     Pre-treatment Symptoms/Complaints:  Patient reports having a fall the other day over a baby gate and landing on her R knee. Also reports having some increased mid back pain last week, but feeling better now. Pain: Initial: Pain Intensity 1: 6  Pain Location 1: Back  Pain Orientation 1: Lower, Left  Post Session:  5/10   Medications Last Reviewed:  2021  Updated Objective Findings:  Mild edema R knee from fall   TREATMENT:   THERAPEUTIC EXERCISE: ( 40 minutes):  Exercises per grid below to improve mobility, strength, balance and coordination. Required moderate visual, verbal and manual cues to promote proper body alignment, promote proper body posture and promote proper body mechanics. Progressed resistance, range, repetitions and complexity of movement as indicated.      Date:  2021 Date:  2021 Date:     Activity/Exercise Parameters Parameters Parameters   Transverse Abdominus (TA) contraction HEP 10 x 10 seconds Hamstring stretch HEP Strap, 3 x 30 seconds each    Piriformis stretch HEP 3 x 30 seconds each    Scapular retraction HEP 2 x 10    LTR --- 2 x 10    SKTC --- 3 x 30 seconds each    TA hip abduction --- Red, 2 x 10    TA hip adduction --- Pillow, 10 x 10 seconds                  Time spent with patient reviewing proper muscle recruitment and technique with exercises. MANUAL THERAPY: ( minutes): Joint mobilization, Soft tissue mobilization and Manipulation was utilized and necessary because of the patient's restricted joint motion, painful spasm, loss of articular motion and restricted motion of soft tissue   Supine long axis distraction grade V to L LE with audible cavitation to correct innominate rotation    MODALITIES: (15 minutes): *  Electrical Stimulation Therapy (interferrential with 4 pads to gross thoracolumbar region) was provided with intensity adjusted throughout treatment to patient tolerance. patient in supine with heat - performed, but not charged     HEP: As above; handouts given to patient for all exercises. Treatment/Session Summary:    · Response to Treatment:  Patient tolerated exercises well today with decreased low back pain by end of session. .  · Communication/Consultation:  None today  · Equipment provided today:  None today  · Recommendations/Intent for next treatment session: Next visit will focus on pain control, manual therapy and modalities as needed, progression of postural and core stability exercises as tolerated.     Total Treatment Billable Duration:  40 minutes  PT Patient Time In/Time Out  Time In: 1430  Time Out: 610 Kt Ramirez, PT

## 2021-07-27 ENCOUNTER — HOSPITAL ENCOUNTER (OUTPATIENT)
Dept: PHYSICAL THERAPY | Age: 36
Discharge: HOME | End: 2021-07-27
Payer: COMMERCIAL

## 2021-07-27 NOTE — PROGRESS NOTES
[address] at [facility] 7/27/2021      Patient out of town this week and will not be seen.        Norma Chavarria, PT, DPT, OMT-C

## 2021-08-06 ENCOUNTER — HOSPITAL ENCOUNTER (OUTPATIENT)
Dept: PHYSICAL THERAPY | Age: 36
Discharge: HOME | End: 2021-08-06
Payer: COMMERCIAL

## 2021-08-06 ENCOUNTER — APPOINTMENT (OUTPATIENT)
Dept: PHYSICAL THERAPY | Age: 36
End: 2021-08-06
Payer: COMMERCIAL

## 2021-08-06 NOTE — PROGRESS NOTES
[address] at [facility] 8/6/2021      Patient called to cancel today's appointment stating her MD told her to quarantine due to Covid-19 exposure. She is to quarantine until Thursday next week and is scheduled for that day. Plan to call patient for Covid-19 screen prior to visit next week.         Turner Cramer, PT, DPT, OMT-C

## 2021-08-12 ENCOUNTER — HOSPITAL ENCOUNTER (OUTPATIENT)
Dept: PHYSICAL THERAPY | Age: 36
Discharge: HOME | End: 2021-08-12
Payer: COMMERCIAL

## 2021-08-12 PROCEDURE — 97110 THERAPEUTIC EXERCISES: CPT

## 2021-08-12 NOTE — PROGRESS NOTES
Mariposa Eller  : 1985  Primary: [facility]  Secondary:  [facility] at [facility]  [address] [place], [address], [address], [address] [place] [address]  [phone]   [phone]      OUTPATIENT PHYSICAL THERAPY: Daily Treatment Note 2021    ICD-10: Treatment Diagnosis: lumbago with sciatica, left side (M54.42)                Treatment Diagnosis 2: L knee pain (M25.562)                Treatment Diagnosis 3: other abnormalities of gait and mobility (R26.89)   Precautions: anxiety  Allergies: Morphine and Pantoprazole   TREATMENT PLAN:  Effective Dates: 2021 TO 2021 (60 days). Frequency/Duration: 2 times a week for 60 Day(s) MEDICAL/REFERRING DIAGNOSIS:  Lumbago with sciatica, left side [M54.42]   DATE OF ONSET: patient had a fall in 2018 injuring her back and her knee. Current episode of pain started about 6 months ago  REFERRING PHYSICIAN: Coby Snow MD MD Orders: Evaluate and Treat   Return MD Appointment: 2021     Pre-treatment Symptoms/Complaints:  Patient reported less pain in her mid and lower back. Pain: Initial: Pain Intensity 1: 4  Pain Location 1: Back  Pain Orientation 1: Lower, Left  Post Session:  1/10   Medications Last Reviewed:  2021  Updated Objective Findings:  Pt.compliant with all exercises. TREATMENT:   THERAPEUTIC EXERCISE: ( 40 minutes):  Exercises per grid below to improve mobility, strength, balance and coordination. Required moderate visual, verbal and manual cues to promote proper body alignment, promote proper body posture and promote proper body mechanics. Progressed resistance, range, repetitions and complexity of movement as indicated.      Date:  2021 Date:  2021 Date:  21   Activity/Exercise Parameters Parameters Parameters   Transverse Abdominus (TA) contraction HEP 10 x 10 seconds X 10 reps x 10 sec hold    Hamstring stretch HEP Strap, 3 x 30 seconds each Strap 3x30 sec hold each side BLE's Piriformis stretch HEP 3 x 30 seconds each Strap 4x30 sec hold each side BLE's    Scapular retraction HEP 2 x 10 2x10    LTR --- 2 x 10 2x10    SKTC --- 3 x 30 seconds each 3x30 sec hold each   TA hip abduction --- Red, 2 x 10 Red 2x10    TA hip adduction --- Pillow, 10 x 10 seconds Yellow ball x 10 reps x 10 sec hold                  Time spent with patient reviewing proper muscle recruitment and technique with exercises. MANUAL THERAPY: (10 minutes): Joint mobilization, Soft tissue mobilization and Manipulation was utilized and necessary because of the patient's restricted joint motion, painful spasm, loss of articular motion and restricted motion of soft tissue (no charge)   Supine long axis distraction grade V to L LE with audible cavitation to correct innominate rotation    MODALITIES: (10 minutes): *  Electrical Stimulation Therapy (interferrential with 4 pads to gross thoracolumbar region) was provided with intensity adjusted throughout treatment to patient tolerance. patient in supine with heat - (no charge)    HEP: As above; handouts given to patient for all exercises. Treatment/Session Summary:    · Response to Treatment:  Patient tolerated exercises well today with decreased low back pain by end of session. .  · Communication/Consultation:  None today  · Equipment provided today:  None today  · Recommendations/Intent for next treatment session: Next visit will focus on pain control, manual therapy and modalities as needed, progression of postural and core stability exercises as tolerated.     Total Treatment Billable Duration:  40 minutes  PT Patient Time In/Time Out  Time In: 1430  Time Out: Anuradha 45, PTA

## 2021-08-18 ENCOUNTER — HOSPITAL ENCOUNTER (OUTPATIENT)
Dept: PHYSICAL THERAPY | Age: 36
Discharge: HOME | End: 2021-08-18
Payer: COMMERCIAL

## 2021-08-18 PROCEDURE — 97110 THERAPEUTIC EXERCISES: CPT

## 2021-08-18 NOTE — PROGRESS NOTES
Mendez Doc  : 1985  Primary: [facility]  Secondary:  [facility] at [facility]  [address], [address], [place], [address]  [phone]   [phone]      OUTPATIENT PHYSICAL THERAPY: Daily Treatment Note 2021    ICD-10: Treatment Diagnosis: lumbago with sciatica, left side (M54.42)                Treatment Diagnosis 2: L knee pain (M25.562)                Treatment Diagnosis 3: other abnormalities of gait and mobility (R26.89)   Precautions: anxiety  Allergies: Morphine and Pantoprazole   TREATMENT PLAN:  Effective Dates: 2021 TO 2021 (60 days). Frequency/Duration: 2 times a week for 60 Day(s) MEDICAL/REFERRING DIAGNOSIS:  Lumbago with sciatica, left side [M54.42]   DATE OF ONSET: patient had a fall in 2018 injuring her back and her knee. Current episode of pain started about 6 months ago  REFERRING PHYSICIAN: Allie Mejía MD MD Orders: Evaluate and Treat   Return MD Appointment: 2021     Pre-treatment Symptoms/Complaints:  Patient reports feeling better overall, but still having intermittent back pain. Pain: Initial: Pain Intensity 1: 5  Pain Location 1: Back  Pain Orientation 1: Lower, Mid  Post Session:  2/10   Medications Last Reviewed:  2021  Updated Objective Findings:  None Today   TREATMENT:   THERAPEUTIC EXERCISE: (40 minutes):  Exercises per grid below to improve mobility, strength, balance and coordination. Required moderate visual, verbal and manual cues to promote proper body alignment, promote proper body posture and promote proper body mechanics. Progressed resistance, range, repetitions and complexity of movement as indicated.      Date:  2021 Date:  2021 Date:  21   Activity/Exercise Parameters Parameters Parameters   Transverse Abdominus (TA) contraction 10 x 10 seconds 10 x 10 seconds X 10 reps x 10 sec hold    Hamstring stretch Strap, 3 x 30 seconds each Strap, 3 x 30 seconds each Strap 3x30 sec hold each side BLE's    Piriformis stretch 3 x 30 seconds each 3 x 30 seconds each Strap 4x30 sec hold each side BLE's    Scapular retraction HEP 2 x 10 2x10    LTR 2 x 10 2 x 10 2x10    SKTC 3 x 30 seconds each 3 x 30 seconds each 3x30 sec hold each   TA hip abduction Green, 2 x 10 Red, 2 x 10 Red 2x10    TA hip adduction Pillow, 10 x 10 seconds Pillow, 10 x 10 seconds Yellow ball x 10 reps x 10 sec hold    Bridging 2 x 10     Thoracic extension stretch 1/2 foam roll, 10 x 10 seconds       Time spent with patient reviewing proper muscle recruitment and technique with exercises. MANUAL THERAPY: (0 minutes): Joint mobilization, Soft tissue mobilization and Manipulation was utilized and necessary because of the patient's restricted joint motion, painful spasm, loss of articular motion and restricted motion of soft tissue    Supine long axis distraction grade V to L LE with audible cavitation to correct innominate rotation    MODALITIES: (13 minutes): *  Electrical Stimulation Therapy (interferrential with 4 pads to gross thoracolumbar region) was provided with intensity adjusted throughout treatment to patient tolerance. patient in supine with heat - (no charge)    HEP: As above; handouts given to patient for all exercises. Treatment/Session Summary:    · Response to Treatment:  Patient with good performance of exercises. Good tolerance for thoracic rotation and extension stretching with decrease pain at end of session. · Communication/Consultation:  None today  · Equipment provided today:  None today  · Recommendations/Intent for next treatment session: Next visit will focus on pain control, manual therapy and modalities as needed, progression of postural and core stability exercises as tolerated.     Total Treatment Billable Duration:  40 minutes  PT Patient Time In/Time Out  Time In: 1335  Time Out: 1429  Rena Lee, PT

## 2021-09-30 NOTE — THERAPY DISCHARGE
Elsi Espinoza  : 1985  Primary: [facility]  Secondary:  [address] at [place] [address] [place], [address] [place] [address] Avenue, [place], [address] [place] [address]  [phone]   [phone]       OUTPATIENT PHYSICAL THERAPY:Discontinuation Summary 2021    ICD-10: Treatment Diagnosis: lumbago with sciatica, left side (M54.42)                Treatment Diagnosis 2: L knee pain (M25.562)                Treatment Diagnosis 3: other abnormalities of gait and mobility (R26.89)   Precautions: anxiety  Allergies: Morphine and Pantoprazole   TREATMENT PLAN:  Effective Dates: 2021 TO 2021 (60 days). Frequency/Duration: 2 times a week for 60 Day(s) MEDICAL/REFERRING DIAGNOSIS:  Lumbago with sciatica, left side [M54.42]   DATE OF ONSET: patient had a fall in 2018 injuring her back and her knee. Current episode of pain started about 6 months ago  REFERRING PHYSICIAN: Macarena Ambrosio MD MD Orders: Evaluate and Treat   Return MD Appointment: 2021     INITIAL ASSESSMENT:  Ms. Becca Saeed is a 31 y.o. female presenting to physical therapy with complaints of L sided low back and knee pain which is chronic in nature, but the current episode began about 6 months ago. She reports a fall down the stairs in 2018 where she tore her L meniscus and had injections and physical therapy. She reports her L knee buckling and having some falls over the past year. Patient with complaints of L sided low back pain that seems to run into her thigh down to her knee and up to her mid back. She believes that some of her pain is due to needing a breast reduction. She is eager to reduce her pain, sleep without waking at night with pain, perform housework, and return to exercise for weight management.  Patient presents with increased pain, decreased strength, decreased ROM, decreased flexibility, impaired gait, impaired posture, impaired overhead reach, impaired transfer ability, decreased activity tolerance, and overall impaired functional mobility. Patient is a good candidate for skilled physical therapy interventions to include manual therapy, therapeutic exercise, balance training, gait training, transfer training, postural re-education, body mechanics training, and pain modalities as needed. DISCONTINUATION 9/30/2021: Patient was seen for 4 sessions of physical therapy from 7/1/2021 to 8/18/2021. Patient reported feeling some better overall, but still having intermittent back pain. She missed her last scheduled appointment. Patient called to re-schedule, but did not hear back from patient. No goals were met due to patient not returning to therapy. She has no more appointment scheduled and is discharged from plan of care at this time. PROBLEM LIST (Impacting functional limitations):  1. Decreased Strength  2. Decreased ADL/Functional Activities  3. Decreased Transfer Abilities  4. Decreased Ambulation Ability/Technique  5. Decreased Balance  6. Increased Pain  7. Decreased Activity Tolerance  8. Decreased Pacing Skills  9. Decreased Work Simplification/Energy Conservation Techniques  10. Decreased Flexibility/Joint Mobility  11. Edema/Girth INTERVENTIONS PLANNED: (Treatment may consist of any combination of the following)  1. Balance Exercise  2. Bed Mobility  3. Cold  4. Cryotherapy  5. Electrical Stimulation  6. Family Education  7. Gait Training  8. Heat  9. Home Exercise Program (HEP)  10. Manual Therapy  11. Neuromuscular Re-education/Strengthening  12. Range of Motion (ROM)  13. Therapeutic Activites  14. Therapeutic Exercise/Strengthening  15. Transfer Training  16. Ultrasound (US)     GOALS: (Goals have been discussed and agreed upon with patient.)  Short-Term Functional Goals: Time Frame: 7/1/2021 to 7/30/2021  1. Patient demonstrates independence with home exercise program without verbal cueing provided by therapist. -NOT MET  2.  Patient will report no more than 3/10 low back pain at rest in order to demonstrate improved self pain control and tolerance. 3. Patient will be educated in and demonstrate improved upright posture including decreased anterior head and shoulders to decreased strain on the spine. -NOT MET  4. Patient will be educated in and demonstrate proper squat lift technique in order to reduce stress on bilateral LE and lumbar spine, improve safety, and reduce risk of injury. -NOT MET  5. Patient will be educated in the use of pillows and different positions for sleeping in order to improve comfort and sleep. -NOT MET  Discharge Goals: Time Frame: 7/1/2021 to 8/31/2021  1. Patient will improve gross L LE strength to at least 4+/5 in order to improve safety with ambulation and transfers. -NOT MET  2. Patient will report waking no more than 2 times a week due to back pain in order to improve sleeping pattern for overall health and wellness. -NOT MET  3. Patient will report no more than 5/10 back pain with mopping/ lifting activities in order to return to prior independence with house work. -NOT MET  4. Patient will be able to return to a light gym program with no complaints of back pain in order to improve health and weight management. -NOT MET  5. Patient will report minimal to no L knee buckling in order to demonstrate improved stability and safety. -NOT MET  6. Patient will improve Modified Oswestry Scale score to 12/50 from 15/50. -NOT MET    Outcome Measure: Tool Used: Modified Oswestry Low Back Pain Questionnaire  Score:  Initial: 15/50  Most Recent: X/50 (Date: -- )   Interpretation of Score: Each section is scored on a 0-5 scale, 5 representing the greatest disability. The scores of each section are added together for a total score of 50.       Ambulatory/Rehab Services H2 Model Falls Risk Assessment   Risk Factors:       (1)  Any administered benzodiazepines       (5)  History of Recent Falls [w/in 3 months] Ability to Rise from Chair:       (1)  Pushes up, successful in one attempt   Falls Prevention Plan:       No modifications necessary   Total: (5 or greater = High Risk): 7   ©2010 [facility]. All Rights Reserved. [facility] States Patent #[id number]. Federal Law prohibits the replication, distribution or use without written permission from [facility] of [address] [place] [address]        No updated objective measures due to patient no attending final therapy appointments. Reason for Services/Other Comments:  · Patient discharged at this time. Rehabilitation Potential For Stated Goals: Good  Regarding James Stahl's therapy, I certify that the treatment plan above will be carried out by a therapist or under their direction. Thank you for this referral,  Néstor Machado PT     Referring Physician Signature: Ceferino Moody MD No Signature is Required for this note.

## 2023-05-11 ENCOUNTER — APPOINTMENT (OUTPATIENT)
Dept: GENERAL RADIOLOGY | Age: 38
End: 2023-05-11
Payer: COMMERCIAL

## 2023-05-11 ENCOUNTER — APPOINTMENT (OUTPATIENT)
Dept: ULTRASOUND IMAGING | Age: 38
End: 2023-05-11
Payer: COMMERCIAL

## 2023-05-11 ENCOUNTER — HOSPITAL ENCOUNTER (EMERGENCY)
Age: 38
Discharge: HOME | End: 2023-05-11
Attending: STUDENT IN AN ORGANIZED HEALTH CARE EDUCATION/TRAINING PROGRAM
Payer: COMMERCIAL

## 2023-05-11 VITALS
HEIGHT: 63 IN | RESPIRATION RATE: 18 BRPM | DIASTOLIC BLOOD PRESSURE: 86 MMHG | BODY MASS INDEX: 29.23 KG/M2 | OXYGEN SATURATION: 100 % | SYSTOLIC BLOOD PRESSURE: 125 MMHG | TEMPERATURE: 97.7 F | WEIGHT: 165 LBS | HEART RATE: 92 BPM

## 2023-05-11 DIAGNOSIS — M25.572 ACUTE LEFT ANKLE PAIN: Primary | ICD-10-CM

## 2023-05-11 PROCEDURE — 93971 EXTREMITY STUDY: CPT

## 2023-05-11 PROCEDURE — 99284 EMERGENCY DEPT VISIT MOD MDM: CPT

## 2023-05-11 PROCEDURE — 73610 X-RAY EXAM OF ANKLE: CPT

## 2023-05-11 RX ORDER — CLOPIDOGREL BISULFATE 75 MG/1
75 TABLET ORAL DAILY
COMMUNITY

## 2023-05-11 ASSESSMENT — PAIN SCALES - GENERAL: PAINLEVEL_OUTOF10: 8

## 2023-05-11 ASSESSMENT — PAIN DESCRIPTION - LOCATION: LOCATION: FOOT

## 2023-05-11 ASSESSMENT — LIFESTYLE VARIABLES
HOW OFTEN DO YOU HAVE A DRINK CONTAINING ALCOHOL: NEVER
HOW MANY STANDARD DRINKS CONTAINING ALCOHOL DO YOU HAVE ON A TYPICAL DAY: PATIENT DOES NOT DRINK

## 2023-05-11 ASSESSMENT — PAIN - FUNCTIONAL ASSESSMENT: PAIN_FUNCTIONAL_ASSESSMENT: 0-10

## 2023-05-11 NOTE — DISCHARGE INSTRUCTIONS
Weight-bear as tolerated. Alternate Tylenol and Motrin as needed for pain. Follow-up with orthopedics within a week. Call their office if you do not hear from them in 2 business days. Return to the ER for worsening or worrisome symptoms.

## 2023-05-11 NOTE — ED TRIAGE NOTES
Pt had dog bite left ankle 4/25/23 seen by chalo. Pt diagnosed with dvt in left ankle started on plavix. Pt with increasing pain and increasing discoloration x 3 days. Pt foot cool, dusky, and tender, cap refill 5-6 sec. Pt with very faint DP pulse on doppler.  Unable to doppler PT.

## 2023-05-11 NOTE — ED PROVIDER NOTES
Procedures    XR ANKLE LEFT (MIN 3 VIEWS)    [address] Jake Robertson MD, Orthopedic Surgery[facility]     Vascular duplex lower extremity venous left        Medications - No data to display    New Prescriptions    No medications on file        Past Medical History:   Diagnosis Date    Anxiety     Managed with meds     Asthma     Inhaler- last use 11/2020    GERD (gastroesophageal reflux disease)     managed with meds     Reflux gastritis     Seizures ([address] 2019    Pt states unconfirmed if episode was a seizure or a migraine , pt had an episode where she zoned out and passed out        Past Surgical History:   Procedure Laterality Date    APPENDECTOMY      DILATION AND CURETTAGE OF UTERUS      HEENT      S/P Tonsillectomy, EGD, Esophagoscopy Flexible with [facility] Dilation 02/05/2021 SFE        Social History     Socioeconomic History    Marital status: Single   Tobacco Use    Smoking status: Never    Smokeless tobacco: Never   Substance and Sexual Activity    Alcohol use: No     Alcohol/week: 0.0 standard drinks    Drug use: No        Previous Medications    ALBUTEROL SULFATE  (90 BASE) MCG/ACT INHALER    INHALE 1 PUFF BY MOUTH EVERY 4 HOURS AS NEEDED    CLOPIDOGREL (PLAVIX) 75 MG TABLET    Take 1 tablet by mouth daily    ETONOGESTREL-ETHINYL ESTRADIOL (NUVARING) 0.12-0.015 MG/24HR VAGINAL RING    Place vaginally    FLUTICASONE (FLONASE) 50 MCG/ACT NASAL SPRAY    SPRAY 1 SPRAY INTO EACH NOSTRIL TWICE A DAY    LANSOPRAZOLE (PREVACID) 30 MG DELAYED RELEASE CAPSULE    Take 30 mg by mouth 2 times daily    LORAZEPAM (ATIVAN) 1 MG TABLET    Take 1 mg by mouth as needed.     ONDANSETRON (ZOFRAN-ODT) 4 MG DISINTEGRATING TABLET    Take 4 mg by mouth every 8 hours as needed    PREDNISOLONE (PRELONE) 15 MG/5ML SYRUP    Take 5ml by mouth every morning for 5 days    TEMAZEPAM (RESTORIL) 30 MG CAPSULE    TAKE 1 CAPSULE BY MOUTH EVERY DAY AT BEDTIME AS NEEDED        Results for orders placed or performed during the

## 2023-05-22 ENCOUNTER — OFFICE VISIT (OUTPATIENT)
Dept: ORTHOPEDIC SURGERY | Age: 38
End: 2023-05-22

## 2023-05-22 VITALS — WEIGHT: 165 LBS | HEIGHT: 63 IN | BODY MASS INDEX: 29.23 KG/M2

## 2023-05-22 DIAGNOSIS — M25.572 ACUTE LEFT ANKLE PAIN: ICD-10-CM

## 2023-05-22 DIAGNOSIS — S91.052A DOG BITE OF LEFT ANKLE, INITIAL ENCOUNTER: Primary | ICD-10-CM
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
## 2023-05-22 DIAGNOSIS — G90.522 COMPLEX REGIONAL PAIN SYNDROME TYPE 1 OF LEFT LOWER EXTREMITY: ICD-10-CM

## 2023-05-22 DIAGNOSIS — R52 PAIN AGGRAVATED BY WALKING: ICD-10-CM

## 2023-05-22 RX ORDER — LIDOCAINE 5% 5 G/100G
CREAM TOPICAL
Qty: 45 G | Refills: 2 | Status: SHIPPED | OUTPATIENT
Start: 2023-05-22

## 2023-05-22 RX ORDER — GABAPENTIN 100 MG/1
100 CAPSULE ORAL 3 TIMES DAILY
Qty: 90 CAPSULE | Refills: 2 | Status: SHIPPED | OUTPATIENT
Start: 2023-05-22 | End: 2023-06-21

## 2023-05-22 NOTE — PROGRESS NOTES
The patient was prescribed a walker boot for the patient's left foot. The patient wears a size NA shoe and I fitted them with a S size boot. The patient was fitted and instructed on the use of prescribed walker boot. I explained how to fit themselves and that the plastic flexible piece should always be on the front of the boot and secured by the Velcro straps on top. The air bladder in the boot was adjusted according to proper fit and comfort. The patient walked a short distance and acknowledged satisfaction with current fit. I also explained that they need a heel lift or a higher heeled shoe for the uninvolved LE to help normalize gait and avoid excessive low back stress/strain due to leg length inequality created from walker boot. The patient was also prescribed and fitted with 2 heel lifts to wear inside the walker boot. She also received an evenup shoe lift for the right side. Patient read and signed documenting they understand and agree to [facility]'s current DME return policy.

## 2023-05-22 NOTE — PROGRESS NOTES
Name: Jose Luis Carmona  YOB: 1985  Gender: female  MRN: [id number]     CC: Left ankle pain    HPI:   04/24/2023: Left ankle dog bite: Great Bobby:  04/24/2023: [facility] ED: Liquid Augmentin  05/11/2023: [facility] ED: Negative duplex ultrasound  05/22/2023: Initial visit: Left ankle pain    ROS/Meds/PSH/PMH/FH/SH: reviewed today    Tobacco:  reports that she has never smoked. She has never used smokeless tobacco.     Physical Examination:  Patient appears to be alert and oriented with acceptable appearance.   No obvious distress or SOB  CV: appears to have acceptable vascular color and capillary refill  Neuro: appears to have mostly intact light touch sensation   Skin: Healed medial and lateral ankle dog bite wounds; no redness; no drainage  MS: Standing: None plantigrade: Gait protected with crutches  Left = with her knee bent I can bring her to neutral, with knee extended she blocks ankle dorsiflexion  Left = medial and lateral ankle pain/neuralgia  Left = no gross loss of active or passive motion but protects testing    XR: Left: [facility]B ankle films 05/11/2023 with lateral fibular indentation but no surrounding lucency or destruction  XR Impression:  As above      Reviewed Test/Records/Documents:   04/24/2023: [facility] ED: Reflects being attacked by a great Bobby and bitten in her left ankle: X-rays read as no acutely displaced fracture or dislocation and no discrete destructive osseous lesion: Issue Toradol IM injection and I believe prescribed Augmentin liquid  05/11/2023: [facility] ED: Reflects left ankle pain: Reflects PCP prescribed Plavix for possible DVT: [facility] ED ultrasound with no evidence of DVT: Reflects dog bite 3 weeks prior and that her left foot will change colors and get cold at times: Reflects intact pulses and equal pulses via Doppler reflects no significant swelling and Achilles intact  05/11/2023: Left lower extremity duplex ultrasound: Radiology impression: No evidence of deep vein

## 2023-05-23 ENCOUNTER — CLINICAL DOCUMENTATION (OUTPATIENT)
Dept: ORTHOPEDIC SURGERY | Age: 38
End: 2023-05-23

## 2023-05-26 ENCOUNTER — TELEPHONE (OUTPATIENT)
Dept: ORTHOPEDIC SURGERY | Age: 38
End: 2023-05-26

## 2023-05-30 ENCOUNTER — HOSPITAL ENCOUNTER (OUTPATIENT)
Dept: MRI IMAGING | Age: 38
Discharge: HOME | End: 2023-06-02
Payer: COMMERCIAL

## 2023-05-30 DIAGNOSIS — G90.522 COMPLEX REGIONAL PAIN SYNDROME TYPE 1 OF LEFT LOWER EXTREMITY: ICD-10-CM
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
## 2023-05-30 DIAGNOSIS — M25.572 ACUTE LEFT ANKLE PAIN: ICD-10-CM

## 2023-05-30 DIAGNOSIS — S91.052A DOG BITE OF LEFT ANKLE, INITIAL ENCOUNTER: ICD-10-CM

## 2023-05-30 DIAGNOSIS — R52 PAIN AGGRAVATED BY WALKING: ICD-10-CM

## 2023-05-30 PROCEDURE — 6360000004 HC RX CONTRAST MEDICATION: Performed by: ORTHOPAEDIC SURGERY

## 2023-05-30 PROCEDURE — A9579 GAD-BASE MR CONTRAST NOS,1ML: HCPCS | Performed by: ORTHOPAEDIC SURGERY

## 2023-05-30 PROCEDURE — 73723 MRI JOINT LWR EXTR W/O&W/DYE: CPT

## 2023-05-30 RX ADMIN — GADOTERIDOL 15 ML: 279.3 INJECTION, SOLUTION INTRAVENOUS at 20:17

## 2023-05-31 ENCOUNTER — OFFICE VISIT (OUTPATIENT)
Dept: ORTHOPEDIC SURGERY | Age: 38
End: 2023-05-31

## 2023-05-31 VITALS — BODY MASS INDEX: 30.12 KG/M2 | HEIGHT: 63 IN | WEIGHT: 170 LBS
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
## 2023-05-31 DIAGNOSIS — M25.572 ACUTE LEFT ANKLE PAIN: ICD-10-CM

## 2023-05-31 DIAGNOSIS — S91.052A DOG BITE OF LEFT ANKLE, INITIAL ENCOUNTER: Primary | ICD-10-CM

## 2023-05-31 NOTE — PROGRESS NOTES
CNA work for another 4 weeks: This note was created using Dragon voice recognition software which may result in errors of speech and spelling recognition and word/phrase syntax errors.

## 2023-06-01 ENCOUNTER — OFFICE VISIT (OUTPATIENT)
Dept: ORTHOPEDIC SURGERY | Age: 38
End: 2023-06-01
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
## 2023-06-01 DIAGNOSIS — S91.052A DOG BITE OF LEFT ANKLE, INITIAL ENCOUNTER: Primary | ICD-10-CM

## 2023-06-01 RX ORDER — CLOPIDOGREL BISULFATE 75 MG/1
TABLET ORAL
COMMUNITY
Start: 2023-05-04

## 2023-06-01 RX ORDER — OXYCODONE AND ACETAMINOPHEN 7.5; 325 MG/1; MG/1
TABLET ORAL
COMMUNITY

## 2023-06-01 RX ORDER — ERGOCALCIFEROL 1.25 MG/1
50000 CAPSULE ORAL WEEKLY
Qty: 12 CAPSULE | Refills: 0 | Status: SHIPPED | OUTPATIENT
Start: 2023-06-01

## 2023-06-01 RX ORDER — FLUTICASONE PROPIONATE 50 MCG
SPRAY, SUSPENSION (ML) NASAL
COMMUNITY

## 2023-06-01 RX ORDER — ZOLPIDEM TARTRATE 10 MG/1
TABLET ORAL
COMMUNITY
Start: 2023-05-12

## 2023-06-01 RX ORDER — CYCLOBENZAPRINE HCL 10 MG
TABLET ORAL
COMMUNITY

## 2023-06-01 RX ORDER — LANSOPRAZOLE 30 MG/1
CAPSULE, DELAYED RELEASE ORAL
COMMUNITY

## 2023-06-01 RX ORDER — ALBUTEROL SULFATE 90 UG/1
AEROSOL, METERED RESPIRATORY (INHALATION)
COMMUNITY

## 2023-06-01 RX ORDER — SYRINGE AND NEEDLE,INSULIN,1ML 25GX1"
SYRINGE, EMPTY DISPOSABLE MISCELLANEOUS
COMMUNITY
Start: 2020-10-15 | End: 2023-06-01

## 2023-06-01 RX ORDER — LIDOCAINE HCL 4% 4 G/100G
CREAM TOPICAL
COMMUNITY
Start: 2022-12-22

## 2023-06-01 RX ORDER — CLINDAMYCIN PALMITATE HYDROCHLORIDE 75 MG/5ML
SOLUTION ORAL
COMMUNITY
Start: 2023-03-13

## 2023-06-01 RX ORDER — AMOXICILLIN AND CLAVULANATE POTASSIUM 400; 57 MG/5ML; MG/5ML
POWDER, FOR SUSPENSION ORAL
COMMUNITY
Start: 2023-04-25

## 2023-06-01 NOTE — PROGRESS NOTES
Patient was fitted and instructed on a Gel Heel Cups for the left foot. The patient was prescribed a Wraptor brace for the patient's leftfoot. The patient wears a size 7.5 shoe and I fitted the patient with a S brace. I explained how to fit the brace properly by pulling the lace tabs across top of foot first then under arch and lastly pulling the strap up firmly and attaching to the lateral Velcro strip. Thus forming a figure 8 across the ankle joint. Once the figure 8 is completed they are to secure the top (short circumferential) straps to help avoid the straps from loosening with normal wear. The patient was able to demonstrate proper fitting in office to ensure compliance with device and acknowledged satisfaction with current fit. Patient read and signed documenting they understand and agree to [facility]'s current DME return policy.
for  Vitamin D2 p.o. 50,000 units, 1 tab every 8 weeks. I discussed how this is Rx strength. I discussed potential constipation and recommended a calcium rich fiber supplement like metamucil. Fani Adams MD         Past Medical History:   Diagnosis Date    Anxiety     Managed with meds     Asthma     Inhaler- last use 11/2020    GERD (gastroesophageal reflux disease)     managed with meds     Reflux gastritis     Seizures ([address] 2019    Pt states unconfirmed if episode was a seizure or a migraine , pt had an episode where she zoned out and passed out         Current Outpatient Medications:     Magic Mouthwash (MIRACLE MOUTHWASH),  Magic Mouthwash, See Instructions, Instructions: [employer]  Viscous Lidocaine, Maalox, Benadryl Qargle and spit 10cc q4h prn, Compound, # 180 mL, 0 Refill(s), Type: Maintenance, Pharmacy: [facility]/pharmacy #[id number] [employer]  Viscous Lidocaine, Maalox,. .., Disp: , Rfl:     zolpidem (AMBIEN) 10 MG tablet, 1 tablet at bedtime as needed, Disp: , Rfl:     clopidogrel (PLAVIX) 75 MG tablet, 1 tablet, Disp: , Rfl:     Lidocaine HCl 4 % CREA, as directed, Disp: , Rfl:     vitamin D (ERGOCALCIFEROL) 1.25 MG (55398 UT) CAPS capsule, Take 1 capsule by mouth once a week, Disp: 12 capsule, Rfl: 0    oxyCODONE-acetaminophen (PERCOCET) 7.5-325 MG per tablet, 1 tablet as needed, Disp: , Rfl:     cyclobenzaprine (FLEXERIL) 10 MG tablet, 1 tablet at bedtime as needed, Disp: , Rfl:     fluticasone (FLONASE) 50 MCG/ACT nasal spray, 1 spray in each nostril, Disp: , Rfl:     lansoprazole (PREVACID) 30 MG delayed release capsule, 1 capsule, Disp: , Rfl:     albuterol sulfate HFA (PROVENTIL;VENTOLIN;PROAIR) 108 (90 Base) MCG/ACT inhaler, 1 puff as needed, Disp: , Rfl:     Insulin Syringe-Needle U-100 (B-D INSULIN SYRINGE 1CC/25G) 25G X 5/8\" 1 ML MISC, as directed, Disp: , Rfl:     amoxicillin-clavulanate (AUGMENTIN) 400-57 MG/5ML suspension, TAKE 11 ML BY MOUTH 2 TIMES DAILY FOR 7 DAYS **DISCARD REMAINING**,
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
## 2023-06-05 DIAGNOSIS — S91.052A DOG BITE OF LEFT ANKLE, INITIAL ENCOUNTER: ICD-10-CM

## 2023-06-05 LAB — ERYTHROCYTE [SEDIMENTATION RATE] IN BLOOD: 33 MM/HR (ref 0–20)

## 2023-06-05 NOTE — THERAPY EVALUATION
gait deviation, balance impairment. Total Duration:  Time In: 1001  Time Out: 1102    Regarding Nesha Rubalcava LOU JusticeAshok's therapy, I certify that the treatment plan above will be carried out by a therapist or under their direction. Thank you for this referral,  Pedro Mccracken, PT     Referring Physician Signature:  Delfina Sheth MD _______________________________ Date _____________        Post Session Pain  Charge Capture  PT Visit Info MD Guidelines  MyChart

## 2023-06-05 NOTE — PROGRESS NOTES
per insurance time spent on therapeutic exercise and manual therapy not billed   Time In: 1001  Time Out: 420 [facility], PT       Charge Capture  }Post Session Pain  PT Visit [address] Portal  MD Guidelines  Scanned Media  Benefits  MyChart    Future Appointments   Date Time Provider Sotero Esqueda   6/22/2023 10:50 AM Elisabeth Mays MD [facility]

## 2023-06-06 ENCOUNTER — HOSPITAL ENCOUNTER (OUTPATIENT)
Dept: PHYSICAL THERAPY | Age: 38
Setting detail: RECURRING SERIES
Discharge: HOME | End: 2023-06-09
Payer: COMMERCIAL

## 2023-06-06 LAB
CRP SERPL-MCNC: 0.9 MG/DL (ref 0–0.9)
WBC # BLD AUTO: 9.4 K/UL (ref 4.3–11.1)

## 2023-06-06 PROCEDURE — 97162 PT EVAL MOD COMPLEX 30 MIN: CPT

## 2023-06-06 ASSESSMENT — PAIN SCALES - GENERAL: PAINLEVEL_OUTOF10: 4

## 2023-06-06 ASSESSMENT — PAIN DESCRIPTION - ORIENTATION: ORIENTATION: LEFT

## 2023-06-06 ASSESSMENT — PAIN DESCRIPTION - LOCATION: LOCATION: ANKLE

## 2023-06-06 ASSESSMENT — PAIN DESCRIPTION - DESCRIPTORS: DESCRIPTORS: ACHING;BURNING

## 2023-06-15 ENCOUNTER — HOSPITAL ENCOUNTER (OUTPATIENT)
Dept: PHYSICAL THERAPY | Age: 38
Setting detail: RECURRING SERIES
Discharge: HOME | End: 2023-06-18
Payer: COMMERCIAL

## 2023-06-15 PROCEDURE — 97110 THERAPEUTIC EXERCISES: CPT

## 2023-06-16 ENCOUNTER — HOSPITAL ENCOUNTER (OUTPATIENT)
Dept: PHYSICAL THERAPY | Age: 38
Setting detail: RECURRING SERIES
Discharge: HOME | End: 2023-06-19
Payer: COMMERCIAL

## 2023-06-16 PROCEDURE — 97110 THERAPEUTIC EXERCISES: CPT

## 2023-06-19 ENCOUNTER — CLINICAL DOCUMENTATION (OUTPATIENT)
Dept: ORTHOPEDIC SURGERY | Age: 38
End: 2023-06-19

## 2023-06-19 NOTE — PROGRESS NOTES
Vincenzo Given  : 1985  Primary:  (No info available)  Secondary:  [address] [place] [id number]  Phone: [phone]  Fax: [phone] Plan Frequency: 3x per week for 8 weeks    Plan of Care/Certification Expiration Date: 23      >PT Visit Info:  Plan Frequency: 3x per week for 8 weeks  Plan of Care/Certification Expiration Date: 23      Visit Count:  5    OUTPATIENT PHYSICAL THERAPY:OP NOTE TYPE: Treatment Note 2023       Episode  }Appt Desk             Treatment Diagnosis:  Difficulty in walking, Not elsewhere classified (R26.2)  Pain in left ankle and joints of left foot [M25.572]  Bitten by dog, initial encounter [I90. 0XXA]  Medical/Referring Diagnosis:  Open bite, left ankle, initial encounter [S91.052A]  Bitten by dog, initial encounter [place].Abbot. 0XXA]  Pain in left ankle and joints of left foot [M25.572]  Referring Physician: Cindy Ibarra MD MD Orders:  PT Eval and Treat; Please begin desensitization PT and once she resolves her sensitivity, begin ankle rehabilitation program   Date of Onset:  Onset Date: 23 (Attacked/bitten on L ankle by Santosh Duval)     Allergies:   Pantoprazole and Morphine  Restrictions/Precautions:  Restrictions/Precautions: Other (comment) (per last ortho visit; WBAT in CAM boot with progression as tolerated into ASO brace)  No data recorded   Interventions Planned (Treatment may consist of any combination of the following):    Current Treatment Recommendations: Strengthening; ROM; Balance training; Functional mobility training; Transfer training; ADL/Self-care training; Cognitive/Perceptual training; Gait training; Stair training; Neuromuscular re-education; Manual; Manual lymphatic drainage; Pain management; Return to work related activity; Home exercise program; Modalities; Dry needling;  Therapeutic activities     >Subjective Comments:   Patient reports 2 days in a row of

## 2023-06-19 NOTE — THERAPY RECERTIFICATION
Decreased posture       Therapy Prognosis:   Therapy Prognosis: Good       Initial Assessment Complexity:   Decision Making: Medium Complexity      PLAN   Effective Dates: 6/20/23 TO Plan of Care/Certification Expiration Date: 09/03/23     Frequency/Duration: Plan Frequency: 2x per week for 8 weeks     Interventions Planned (Treatment may consist of any combination of the following):    Current Treatment Recommendations: Strengthening; ROM; Balance training; Functional mobility training; Transfer training; ADL/Self-care training; Cognitive/Perceptual training; Gait training; Stair training; Neuromuscular re-education; Manual; Manual lymphatic drainage; Pain management; Return to work related activity; Home exercise program; Modalities; Dry needling; Therapeutic activities       Goals: (Goals have been discussed and agreed upon with patient.)  Short-Term Functional Goals: Time Frame: 6/6/23 to 7/6/23  Patient demonstrates independence with home exercise program without verbal cueing provided by therapist. Antonio Justin MET  Patient will improve tolerance to ambulation in ASO brace with ability to ambulate 200ft without gait deficits, increase in pain. ONGOING  Discharge Goals: Time Frame: 7/6/23 to 8/6/23  Patient will improve L ankle mobility to be symmetrical with R ankle. ONGOING  Patient will demonstrate max pain level of 2/10 with all ADLs, work duties, and physical activity. ONGOING  Patient will progress through graded motor imagery protocol without limitation due to pain to address complex regional pain syndrome. ONGOING  Patient will improve FAAM to 49 points or greater. GOAL MET  Patient will improve FAAM to 77 points or greater. NEW GOAL         Outcome Measure: Tool Used: FOOT AND ANKLE ABILITY MEASURE  Score:  Initial: 36 Most Recent: 64 (Date: 6/20/23 )   Interpretation of Score:  For the \"Activities of Daily Living\", there are 21 questions each scored on a 5 point scale with 0 representing \"Unable to do\" and 4

## 2023-06-20 ENCOUNTER — HOSPITAL ENCOUNTER (OUTPATIENT)
Dept: PHYSICAL THERAPY | Age: 38
Setting detail: RECURRING SERIES
Discharge: HOME | End: 2023-06-23
Payer: COMMERCIAL

## 2023-06-20 PROCEDURE — 97110 THERAPEUTIC EXERCISES: CPT

## 2023-06-22 ENCOUNTER — OFFICE VISIT (OUTPATIENT)
Dept: ORTHOPEDIC SURGERY | Age: 38
End: 2023-06-22

## 2023-06-22 DIAGNOSIS — M25.572 ACUTE LEFT ANKLE PAIN: Primary | ICD-10-CM
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
## 2023-06-22 DIAGNOSIS — S91.052S: ICD-10-CM

## 2023-06-22 RX ORDER — GABAPENTIN 100 MG/1
100 CAPSULE ORAL 3 TIMES DAILY
Qty: 90 CAPSULE | Refills: 2 | Status: SHIPPED | OUTPATIENT
Start: 2023-06-22 | End: 2023-07-22

## 2023-06-22 NOTE — PROGRESS NOTES
Name: Jose Bonilla  YOB: 1985  Gender: female  MRN: [id number]     05/31/2023: Presented to discuss MRI scan: She is better. 06/01/2023: Dr. Jersey Randolph: Reflects dog bite left ankle with fibular fracture and incidental peroneal brevis tear. Dr. Jersey Randolph reflected that he did not think she had osteomyelitis and could be treated nonoperatively. Discussed potential future bone biopsy vs peroneal tendon repair. Labs: WBC 9.4: CRP 0.9: ESR 33  06/22/2023: Here on reconsultation from Dr. Jersey Randolph. She is feeling better    HPI:   04/24/2023: Left ankle dog bite: Great Bobby:  04/24/2023: [facility] ED: Liquid Augmentin  05/11/2023: [facility] [place] ED: Negative duplex ultrasound  05/22/2023: Initial visit: Left ankle pain    ROS/Meds/PSH/PMH/FH/SH: reviewed today    Tobacco:  reports that she has never smoked. She has never used smokeless tobacco.     Physical Examination:  Patient appears to be alert and oriented with acceptable appearance.   No obvious distress or SOB  CV: appears to have acceptable vascular color and capillary refill  Neuro: appears to have mostly intact light touch sensation   Skin: Healed medial and lateral ankle dog bite wounds; no redness; no drainage; no swelling  MS: Standing: plantigrade: Gait full in regular shoes  Left = she comes easily to 90; good ankle/foot motion; 5/5 strength    XR: Left: Standing AP lateral mortise ankle taken today with lateral fibular indentation but no surrounding lucency or destruction  XR Impression:  As above      Reviewed Test/Records/Documents:   04/24/2023: [facility] ED: Reflects being attacked by a great Bobby and bitten in her left ankle: X-rays read as no acutely displaced fracture or dislocation and no discrete destructive osseous lesion: Issue Toradol IM injection and I believe prescribed Augmentin liquid  05/11/2023: [facility] ED: Reflects left ankle pain: Reflects PCP prescribed Plavix for possible DVT: [facility] ED ultrasound with no evidence of DVT: Reflects dog

## 2023-06-22 NOTE — PROGRESS NOTES
Patient was prescribed a Night splint for the patient's left foot. The patient wears a size 7.5 shoe and I fitted the patient with a S size night splint. Additionally, I instructed the patient on proper use and care of device as well as ensuring patient could safely get device on and off. I explained to the patient that wearing the splint, the foot is held in a certain position, called dorsiflexion. This means that the fascia is stretched, not allowing it to contract and become tighter overnight. The great thing about a night splint is that the remedy is quite gentle and the fascia will be returned to its proper length over a period of time. Once stretched out, the plantar fascia will become less tense and therefore will cause less pain. Patient read and signed documenting they understand and agree to [facility]'s current DME return policy.

## 2023-06-26 ENCOUNTER — HOSPITAL ENCOUNTER (OUTPATIENT)
Dept: PHYSICAL THERAPY | Age: 38
Setting detail: RECURRING SERIES
End: 2023-06-26
Payer: COMMERCIAL

## 2023-06-26 ENCOUNTER — CLINICAL DOCUMENTATION (OUTPATIENT)
Dept: ORTHOPEDIC SURGERY | Age: 38
End: 2023-06-26

## 2023-06-30 ENCOUNTER — HOSPITAL ENCOUNTER (OUTPATIENT)
Dept: PHYSICAL THERAPY | Age: 38
Setting detail: RECURRING SERIES
End: 2023-06-30
Payer: COMMERCIAL

## 2023-06-30 PROCEDURE — 97110 THERAPEUTIC EXERCISES: CPT

## 2023-07-06 ENCOUNTER — HOSPITAL ENCOUNTER (OUTPATIENT)
Dept: PHYSICAL THERAPY | Age: 38
Setting detail: RECURRING SERIES
Discharge: HOME | End: 2023-07-09
Payer: COMMERCIAL

## 2023-07-06 PROCEDURE — 97110 THERAPEUTIC EXERCISES: CPT

## 2023-07-10 ENCOUNTER — HOSPITAL ENCOUNTER (OUTPATIENT)
Dept: PHYSICAL THERAPY | Age: 38
Setting detail: RECURRING SERIES
Discharge: HOME | End: 2023-07-13
Payer: COMMERCIAL

## 2023-07-10 PROCEDURE — 97110 THERAPEUTIC EXERCISES: CPT

## 2023-07-12 ENCOUNTER — CLINICAL DOCUMENTATION (OUTPATIENT)
Dept: ORTHOPEDIC SURGERY | Age: 38
End: 2023-07-12

## 2023-07-17 ENCOUNTER — CLINICAL DOCUMENTATION (OUTPATIENT)
Dept: ORTHOPEDIC SURGERY | Age: 38
End: 2023-07-17

## 2023-07-18 ENCOUNTER — HOSPITAL ENCOUNTER (OUTPATIENT)
Dept: PHYSICAL THERAPY | Age: 38
Setting detail: RECURRING SERIES
Discharge: HOME | End: 2023-07-21
Payer: COMMERCIAL

## 2023-07-18 ENCOUNTER — CLINICAL DOCUMENTATION (OUTPATIENT)
Dept: ORTHOPEDIC SURGERY | Age: 38
End: 2023-07-18

## 2023-07-18 PROCEDURE — 97110 THERAPEUTIC EXERCISES: CPT

## 2023-07-19 NOTE — PROGRESS NOTES
Klaudia Mckee  : 1985  Primary:  (No info available)  Secondary:  [address] @ [address] [facility] [address]  Phone: [phone]  Fax: [phone] Plan Frequency: 2x per week for 8 weeks    Plan of Care/Certification Expiration Date: 23      >PT Visit Info:  Plan Frequency: 2x per week for 8 weeks  Plan of Care/Certification Expiration Date: 23      Visit Count:  10    OUTPATIENT PHYSICAL THERAPY:OP NOTE TYPE: Treatment Note 2023       Episode  }Appt Desk             Treatment Diagnosis:  Difficulty in walking, Not elsewhere classified (R26.2)  Pain in left ankle and joints of left foot [M25.572]  Bitten by dog, initial encounter [L91. 0XXA]  Medical/Referring Diagnosis:  Open bite, left ankle, initial encounter [S91.052A]  Bitten by dog, initial encounter Yareli.Hirschfeld. 0XXA]  Pain in left ankle and joints of left foot [M25.572]  Referring Physician: Manuela Yang MD MD Orders:  PT Eval and Treat; Please begin desensitization PT and once she resolves her sensitivity, begin ankle rehabilitation program   Date of Onset:  Onset Date: 23 (Attacked/bitten on L ankle by Misty Langston)     Allergies:   Pantoprazole and Morphine  Restrictions/Precautions:  Restrictions/Precautions: Other (comment) (per last ortho visit; WBAT in CAM boot with progression as tolerated into ASO brace)  No data recorded   Interventions Planned (Treatment may consist of any combination of the following):    Current Treatment Recommendations: Strengthening; ROM; Balance training; Functional mobility training; Transfer training; ADL/Self-care training; Cognitive/Perceptual training; Gait training; Stair training; Neuromuscular re-education; Manual; Manual lymphatic drainage; Pain management; Return to work related activity; Home exercise program; Modalities; Dry needling;  Therapeutic activities     >Subjective Comments:   Patient reports her ankle has done

## 2023-07-21 ENCOUNTER — CLINICAL DOCUMENTATION (OUTPATIENT)
Dept: ORTHOPEDIC SURGERY | Age: 38
End: 2023-07-21

## 2023-07-25 ENCOUNTER — HOSPITAL ENCOUNTER (OUTPATIENT)
Dept: PHYSICAL THERAPY | Age: 38
Setting detail: RECURRING SERIES
Discharge: HOME | End: 2023-07-28
Payer: COMMERCIAL

## 2023-07-25 PROCEDURE — 97110 THERAPEUTIC EXERCISES: CPT

## 2023-07-25 NOTE — THERAPY RECERTIFICATION
Nora Bose  : 1985  Primary:  (No info available)  Secondary:  [address] @ [address] [facility] [address]  Phone: [phone]  Fax: [phone] Plan Frequency: 1x per week for 10 weeks    Plan of Care/Certification Expiration Date: 10/03/23      PT Visit Info:  Plan Frequency: 1x per week for 10 weeks  Plan of Care/Certification Expiration Date: 10/03/23      Visit Count:  11                OUTPATIENT PHYSICAL THERAPY:             OP NOTE TYPE: Recertification 120               Episode (L Ankle) Appt Desk         Treatment Diagnosis:  Difficulty in walking, Not elsewhere classified (R26.2)  Pain in left ankle and joints of left foot [M25.572]  Bitten by dog, initial encounter [C05. 0XXA]  Medical/Referring Diagnosis:  Open bite, left ankle, initial encounter [S91.052A]  Bitten by dog, initial encounter [place].Nicole. 0XXA]  Pain in left ankle and joints of left foot [M25.572]  Referring Physician: Yandy Mckenna MD MD Orders:  PT Eval and Treat; Please begin desensitization PT and once she resolves her sensitivity, begin ankle rehabilitation program   Return MD Appt:  23  Date of Onset:  Onset Date: 23 (Attacked/bitten on L ankle by Ga Jones)      Allergies:  Pantoprazole and Morphine  Restrictions/Precautions:    Restrictions/Precautions: Other (comment) (per last ortho visit; WBAT in CAM boot with progression as tolerated into ASO brace)        Medications Last Reviewed:  2023             OBJECTIVE     Patient denies any LE paresthesia. Patient denies any increase of symptoms with cough, sneeze or valsalva. Patient denies any saddle paresthesia or bowel/bladder deficits.      Observation/Orthostatic Postural Assessment:          Well developed woman presents with CAM boot; increased lumbar lordosis in standing, decreased weightbearing through LLE    Palpation:  vascular changes evident with slowed capillary

## 2023-07-25 NOTE — PROGRESS NOTES
Sharri Tong  : 1985  Primary:  (No info available)  Secondary:  [address] @ [address] [facility] [address]  Phone: [phone]  Fax: [phone] Plan Frequency: 1x per week for 10 weeks    Plan of Care/Certification Expiration Date: 10/03/23      >PT Visit Info:  Plan Frequency: 1x per week for 10 weeks  Plan of Care/Certification Expiration Date: 10/03/23      Visit Count:  11    OUTPATIENT PHYSICAL THERAPY:OP NOTE TYPE: Treatment Note 2023       Episode  }Appt Desk             Treatment Diagnosis:  Difficulty in walking, Not elsewhere classified (R26.2)  Pain in left ankle and joints of left foot [M25.572]  Bitten by dog, initial encounter [Y50. 0XXA]  Medical/Referring Diagnosis:  Open bite, left ankle, initial encounter [S91.052A]  Bitten by dog, initial encounter Nelida.Lavender. 0XXA]  Pain in left ankle and joints of left foot [M25.572]  Referring Physician: Jude Thao MD MD Orders:  PT Eval and Treat; Please begin desensitization PT and once she resolves her sensitivity, begin ankle rehabilitation program   Date of Onset:  Onset Date: 23 (Attacked/bitten on L ankle by Reche Rides)     Allergies:   Pantoprazole and Morphine  Restrictions/Precautions:  Restrictions/Precautions: Other (comment) (per last ortho visit; WBAT in CAM boot with progression as tolerated into ASO brace)  No data recorded   Interventions Planned (Treatment may consist of any combination of the following):    Current Treatment Recommendations: Strengthening; ROM; Balance training; Functional mobility training; Transfer training; ADL/Self-care training; Cognitive/Perceptual training; Gait training; Stair training; Neuromuscular re-education; Manual; Manual lymphatic drainage; Pain management; Return to work related activity; Home exercise program; Modalities; Dry needling;  Therapeutic activities     >Subjective Comments:   Patient reports feeling better

## 2023-08-01 ENCOUNTER — HOSPITAL ENCOUNTER (OUTPATIENT)
Dept: PHYSICAL THERAPY | Age: 38
Setting detail: RECURRING SERIES
Discharge: HOME | End: 2023-08-04
Payer: COMMERCIAL

## 2023-08-01 PROCEDURE — 97110 THERAPEUTIC EXERCISES: CPT

## 2023-08-02 NOTE — PROGRESS NOTES
[facility] at [facility] 8/4/2023      Patient was scheduled for 1 visit the week of 8/21 (planned return to work) and will be scheduled for 1 additional visit after that pending her response to return to work. Her insurance approved only 2 visits despite request for an additional 10.         Latoya Lovelace PT, DPT, OCS

## 2023-08-04 ENCOUNTER — HOSPITAL ENCOUNTER (OUTPATIENT)
Dept: PHYSICAL THERAPY | Age: 38
Setting detail: RECURRING SERIES
End: 2023-08-04
Payer: COMMERCIAL

## 2023-08-17 ENCOUNTER — OFFICE VISIT (OUTPATIENT)
Dept: ORTHOPEDIC SURGERY | Age: 38
End: 2023-08-17

## 2023-08-17 VITALS — WEIGHT: 170 LBS | BODY MASS INDEX: 30.12 KG/M2 | HEIGHT: 63 IN

## 2023-08-17 DIAGNOSIS — M79.2 LEG NEURALGIA, UNSPECIFIED LATERALITY: ICD-10-CM

## 2023-08-17 DIAGNOSIS — S91.052S: Primary | ICD-10-CM
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
## 2023-08-17 RX ORDER — LIDOCAINE 5% 5 G/100G
CREAM TOPICAL
Qty: 45 G | Refills: 2 | Status: SHIPPED | OUTPATIENT
Start: 2023-08-17

## 2023-08-17 NOTE — PROGRESS NOTES
Patient was fitted and instructed on an [employer] Ankle Sleeve for the left foot. Patient read and signed documenting they understand and agree to [facility]'s current DME return policy.

## 2023-08-17 NOTE — PROGRESS NOTES
Name: Fidel Moore  YOB: 1985  Gender: female  MRN: [id number]     05/31/2023: Presented to discuss MRI scan: She was better. 06/01/2023: Dr. Gerardo Zayas: Reflects dog bite left ankle with fibular fracture and incidental peroneal brevis tear. Dr. Gerardo Zayas reflected that he did not think she had osteomyelitis and could be treated nonoperatively. Discussed potential future bone biopsy vs peroneal tendon repair. Labs: WBC 9.4: CRP 0.9: ESR 33  06/22/2023: Re-consultation from Dr. Gerardo Zayas. She was feeling better  08/17/2023: She has had slight regression with pain burning and intermittent swelling    HPI:   04/24/2023: Left ankle dog bite: Great Bobby:  04/24/2023: [facility] ED: Liquid Augmentin  05/11/2023: [facility] [place] ED: Negative duplex ultrasound  05/22/2023: Initial visit: Left ankle pain    ROS/Meds/PSH/PMH/FH/SH: reviewed today    Tobacco:  reports that she has never smoked. She has never used smokeless tobacco.     Physical Examination:  Patient appears to be alert and oriented with acceptable appearance.   No obvious distress or SOB  CV: appears to have acceptable vascular color and capillary refill  Neuro: appears to have mostly intact light touch sensation   Skin: Healed medial and lateral ankle dog bite wounds; no redness; no drainage   MS: Standing: plantigrade: Gait full    Left = anterior lateral to medial ankle soft-tissue neuralgia   Left = full ankle/foot motion; 5/5 strength; no instability or crepitance    XR: Left: Standing AP lateral mortise ankle taken 06/22/2023 with lateral fibular indentation but no surrounding lucency or destruction  XR Impression:  As above      Reviewed Test/Records/Documents:   04/24/2023: [facility] ED: Reflects being attacked by a great Bobby and bitten in her left ankle: X-rays read as no acutely displaced fracture or dislocation and no discrete destructive osseous lesion: Issue Toradol IM injection and I believe prescribed Augmentin liquid  05/11/2023: [facility] [place] ED:

## 2023-08-22 ENCOUNTER — TELEPHONE (OUTPATIENT)
Dept: ORTHOPEDIC SURGERY | Age: 38
End: 2023-08-22

## 2023-08-24 ENCOUNTER — HOSPITAL ENCOUNTER (OUTPATIENT)
Dept: PHYSICAL THERAPY | Age: 38
Setting detail: RECURRING SERIES
End: 2023-08-24
Payer: COMMERCIAL

## 2023-08-24 NOTE — PROGRESS NOTES
[address] at [facility] 8/24/2023      Patient cancelled appointment due to family illness/emergency. Provided call back to reschedule, left message with callback number.        Viki Moreno PT, DPT, OCS

## 2023-08-30 ENCOUNTER — CLINICAL DOCUMENTATION (OUTPATIENT)
Dept: ORTHOPEDIC SURGERY | Age: 38
End: 2023-08-30

## 2023-08-31 ENCOUNTER — CLINICAL DOCUMENTATION (OUTPATIENT)
Dept: ORTHOPEDIC SURGERY | Age: 38
End: 2023-08-31

## 2023-09-06 ENCOUNTER — CLINICAL DOCUMENTATION (OUTPATIENT)
Dept: ORTHOPEDIC SURGERY | Age: 38
End: 2023-09-06

## 2023-09-06 ENCOUNTER — HOSPITAL ENCOUNTER (OUTPATIENT)
Dept: PHYSICAL THERAPY | Age: 38
Setting detail: RECURRING SERIES
End: 2023-09-06
Payer: COMMERCIAL

## 2023-09-12 ENCOUNTER — HOSPITAL ENCOUNTER (OUTPATIENT)
Dept: PHYSICAL THERAPY | Age: 38
Setting detail: RECURRING SERIES
Discharge: HOME | End: 2023-09-15
Payer: COMMERCIAL

## 2023-09-12 PROCEDURE — 97110 THERAPEUTIC EXERCISES: CPT

## 2023-09-12 PROCEDURE — 97140 MANUAL THERAPY 1/> REGIONS: CPT

## 2023-09-12 NOTE — PROGRESS NOTES
because of needing to focus on her daughter's situation. She is being considered for a follow up surgery on L ankle because of continued extreme swelling after prolonged WB. She will see Dr. Art Hall this week for consultation on that. >Initial:      /10>Post Session:       0 /10  Medications Last Reviewed:  9/12/2023  Updated Objective Findings:   weak to L flexion for 2-5th toes  for FDLongus   Treatment   THERAPEUTIC EXERCISE: (35 minutes):    Exercises per grid below to improve mobility, strength, balance, coordination, and dynamic movement of left ankle to improve functional bending, lifting, carrying, and walking . Required moderate visual, verbal, manual, and tactile cues to promote proper body alignment, promote proper body posture, and promote proper body mechanics. Progressed resistance, range, repetitions, and complexity of movement as indicated. At session start: functional talus joint mobilization as follows:   Talocrural AP with distraction in prone with DF  Talocrural DF stretching in supine  Attempted standing talocrural mob with active squatting with belt assist but too painful to deep pressure at anterior talus   Date:  9/12/23 Date:  7/25/23 Date:  8/1/23   Activity/Exercise Parameters Parameters Parameters   Doming x10x3s hold    Also doming with toes straight 10 sec x 3    Also manual resist to  4 toe flexion and extension in supine 10x full ROM [id number] hold [id number] hold   Toe abduction [id number] hold+ manual resist [id number] hold [id number] hold   Self Dorsiflexion Mobilization Lunge X3 mins 2X3 mins 2X3 mins   Elevated ankle pumps with toe curl 3 mins -- --   Elevated ankle alphabet -- -- --   Standing weight shift x2 min 2x2 min    Step through on LLE next 2x2 min 2x2 min   Gait  next 3x300 ft, improved heel strike/pushoff 3x300 ft, improved heel strike/pushoff   Balance next Airex:  -Narrow base of support 3x1 min    -Tandem stance 3x1 min each    -Single limb stance 5x30s each    Tandem

## 2023-09-14 ENCOUNTER — OFFICE VISIT (OUTPATIENT)
Dept: ORTHOPEDIC SURGERY | Age: 38
End: 2023-09-14
Payer: COMMERCIAL

## 2023-09-14 VITALS — WEIGHT: 170 LBS | HEIGHT: 63 IN | BODY MASS INDEX: 30.12 KG/M2
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
## 2023-09-14 DIAGNOSIS — G90.522 COMPLEX REGIONAL PAIN SYNDROME TYPE 1 OF LEFT LOWER EXTREMITY: Primary | ICD-10-CM

## 2023-09-14 DIAGNOSIS — S91.052S: ICD-10-CM

## 2023-09-14 PROCEDURE — 99214 OFFICE O/P EST MOD 30 MIN: CPT | Performed by: ORTHOPAEDIC SURGERY

## 2023-09-14 RX ORDER — GABAPENTIN 100 MG/1
100 CAPSULE ORAL 3 TIMES DAILY
Qty: 270 CAPSULE | Refills: 2 | Status: SHIPPED | OUTPATIENT
Start: 2023-09-14 | End: 2024-06-10

## 2023-09-14 NOTE — PROGRESS NOTES
Name: Cathy Ramsay  YOB: 1985  Gender: female  MRN: [id number]     05/31/2023: Presented to discuss MRI scan: She was better. 06/01/2023: Dr. Scott Boggs: Reflects dog bite left ankle with fibular fracture and incidental peroneal brevis tear. Dr. Scott Boggs reflected that he did not think she had osteomyelitis and could be treated nonoperatively. Discussed potential future bone biopsy vs peroneal tendon repair. Labs: WBC 9.4: CRP 0.9: ESR 33  06/22/2023: Re-consultation from Dr. Scott Boggs. She was feeling better  08/17/2023: She had slight regression with pain burning and intermittent swelling  09/14/2023: She continues to have pain and limitations    HPI:   04/24/2023: Left ankle dog bite: Great Bobby:  04/24/2023: [facility] ED: Liquid Augmentin  [place] [facility] [place]  05/22/2023: Initial visit: Left ankle pain    ROS/Meds/PSH/PMH/FH/SH: reviewed today    Tobacco:  reports that she has never smoked. She has never used smokeless tobacco.     Physical Examination:  Patient appears to be alert and oriented with acceptable appearance.   No obvious distress or SOB  CV: appears to have acceptable vascular color and capillary refill  Neuro: appears to have mostly intact light touch sensation   Skin: Healed medial and lateral ankle dog bite wounds; no redness; no drainage   MS: Standing: plantigrade: Gait full    Left = anterior lateral to medial ankle soft-tissue neuralgia   Left = full ankle/foot motion; 5/5 strength; no instability or crepitance    XR: Left: Standing AP lateral mortise ankle taken 06/22/2023 with lateral fibular indentation but no surrounding lucency or destruction  XR Impression:  As above      Reviewed Test/Records/Documents:   [facility]: Reflects being attacked by a great Bobby and bitten in her left ankle: X-rays read as no acutely displaced fracture or dislocation and no discrete destructive osseous lesion: Issue Toradol IM injection and I

## 2023-09-15 ENCOUNTER — TELEPHONE (OUTPATIENT)
Dept: ORTHOPEDIC SURGERY | Age: 38
End: 2023-09-15

## 2023-09-19 ENCOUNTER — CLINICAL DOCUMENTATION (OUTPATIENT)
Dept: ORTHOPEDIC SURGERY | Age: 38
End: 2023-09-19

## 2023-09-20 ENCOUNTER — CLINICAL DOCUMENTATION (OUTPATIENT)
Dept: ORTHOPEDIC SURGERY | Age: 38
End: 2023-09-20

## 2023-09-21 ENCOUNTER — HOSPITAL ENCOUNTER (OUTPATIENT)
Dept: PHYSICAL THERAPY | Age: 38
Setting detail: RECURRING SERIES
Discharge: HOME | End: 2023-09-24
Payer: COMMERCIAL

## 2023-09-21 PROCEDURE — 97110 THERAPEUTIC EXERCISES: CPT

## 2023-09-21 NOTE — PROGRESS NOTES
some improvement since last visit. She feels she's ready for discharge to her HEP at this point. >Initial:      /10>Post Session:       0 /10  Medications Last Reviewed:  9/21/2023  Updated Objective Findings:   See Discharge Summary  Treatment   THERAPEUTIC EXERCISE: (56 minutes):    Exercises per grid below to improve mobility, strength, balance, coordination, and dynamic movement of left ankle to improve functional bending, lifting, carrying, and walking . Required moderate visual, verbal, manual, and tactile cues to promote proper body alignment, promote proper body posture, and promote proper body mechanics. Progressed resistance, range, repetitions, and complexity of movement as indicated.      Date:  9/12/23 Date:  9/21/23 Date:  8/1/23   Activity/Exercise Parameters Parameters Parameters   Doming x10x3s hold    Also doming with toes straight 10 sec x 3    Also manual resist to  4 toe flexion and extension in supine 10x full ROM [id number] hold    Also doming with toes straight 10 sec x 3 [id number] hold   Toe abduction [id number] hold+ manual resist [id number] hold [id number] hold   Self Dorsiflexion Mobilization Lunge X3 mins 2X3 mins 2X3 mins   Elevated ankle pumps with toe curl 3 mins -- --   Elevated ankle alphabet -- -- --   Standing weight shift x2 min 2x2 min    Step through on LLE next 2x2 min 2x2 min   Gait  next 3x300 ft, improved heel strike/pushoff 3x300 ft, improved heel strike/pushoff   Balance next Airex:  -Narrow base of support 3x1 min    -Tandem stance 3x1 min each    -Single limb stance 5x30s each    Tandem Walking 7a198mm Single limb stance 5x30s each   Strengthening next Tendon protocol for plantar flexion:  B heel lift isometric 45s on/2 min off x5 Tendon protocol for plantar flexion:  B heel lift isometric 45s on/2 min off x5   NuStep  Level 2 5 minutes    *Provided option for [facility] for HEP-- from previous visit    Time spent with patient reviewing proper muscle recruitment and technique

## 2023-09-21 NOTE — THERAPY DISCHARGE
standing, decreased weightbearing through LLE    Palpation:  vascular changes evident with slowed capillary refill, edema    ROM:           AROM/ PROM Left (degrees) Right (degrees)   Plantarflexion (PF) 49 (from 35) 50   Dorsiflexion (DF) 8 (from 0) 10   DF in Weight Bearing - -   Inversion 20 (from 10) 20   Eversion 10 (from 5) 10   Great toe extension 50 50   Great toe flexion 35 35   Knee Flexion 130 130   Knee Extension 0 0     Strength: Motion Tested Left   (*/5) Right  (*/5)   Dorsiflexion 3 (from 2) 4   Plantarflexion 4 (from 2) 4   Inversion 4 (from 2) 4   Eversion 4 (from 2) 4   Great Toe Extension 4 4   Great Toe Flexion 4+ (from 4) 4   Knee Flexion 5 5   Knee Extension 5 5   Hip Abduction 3+ (From 3) 3+ (From 3)   Hip ER 3+ (From 3) 3+ (From 3)     Special Tests:       Ligament stress tests including anterior drawer is negative; anterior talofibular is negative; calcaneofibular is negative; posterior talofibular is negative; deltoid ligament is negative. Passive Accessory Motion:         Talocrural mobility is slightly limited on L but improved since last measured. Subtalar mobility is slightly limited on L into inversion; normal into eversion. Midtarsal mobility is slightly limited on L. Tarsometatarsal mobility is slightly limited on L. Neurological Screen:              Myotomes: Key muscle strength testing through bilateral LE is normal.   Dermatomes: Sensation to light touch for bilateral LE is intact. Reflexes: Patellar (L3/ L4): Normal                 Achilles (S1/ S2): Normal      Abnormal reflexes: Clonus: (-), Weston: (-), Babinski: (-)  Neural tension tests: Slump test is negative.     Functional Mobility:         Gait/Ambulation: Normal reciprocal gait; excellent heel strike        Transfers: Improved anterior tibial translation    Balance:          R Single leg stance- 30s min sway         L Single leg stance- 26s moderate sway (From 10s moderate sway)           ASSESSMENT

## 2023-09-28 ENCOUNTER — PATIENT MESSAGE (OUTPATIENT)
Dept: ORTHOPEDIC SURGERY | Age: 38
End: 2023-09-28

## 2023-10-04 ENCOUNTER — CLINICAL DOCUMENTATION (OUTPATIENT)
Dept: ORTHOPEDIC SURGERY | Age: 38
End: 2023-10-04

## 2023-10-05 ENCOUNTER — CLINICAL DOCUMENTATION (OUTPATIENT)
Dept: ORTHOPEDIC SURGERY | Age: 38
End: 2023-10-05

## 2023-10-05 NOTE — TELEPHONE ENCOUNTER
Spoke with pt. She has a follow up with Dr. Jennyfer Meyer on 10/12/23. She would like to extend her FMLA until 10/20/23, due to not being able to return to work with restrictions. She will need to return to work with no restrictions.

## 2023-10-12 ENCOUNTER — OFFICE VISIT (OUTPATIENT)
Dept: ORTHOPEDIC SURGERY | Age: 38
End: 2023-10-12

## 2023-10-12 DIAGNOSIS — G90.522 COMPLEX REGIONAL PAIN SYNDROME TYPE 1 OF LEFT LOWER EXTREMITY: Primary | ICD-10-CM
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
## 2023-10-12 DIAGNOSIS — S91.052S: ICD-10-CM

## 2023-10-12 RX ORDER — GABAPENTIN 100 MG/1
100 CAPSULE ORAL 3 TIMES DAILY
Qty: 90 CAPSULE | Refills: 2 | Status: SHIPPED | OUTPATIENT
Start: 2023-10-12 | End: 2023-10-12

## 2023-10-12 RX ORDER — GABAPENTIN 100 MG/1
100 CAPSULE ORAL 3 TIMES DAILY
Qty: 90 CAPSULE | Refills: 2 | Status: SHIPPED | OUTPATIENT
Start: 2023-10-12 | End: 2024-01-10

## 2023-10-12 NOTE — PROGRESS NOTES
Name: Yohan Kumar  YOB: 1985  Gender: female  MRN: [id number]     05/31/2023: Presented to discuss MRI scan: She was better. 06/01/2023: Dr. Citlaly Calle: Reflects dog bite left ankle with fibular fracture and incidental peroneal brevis tear. Dr. Citlaly Calle reflected that he did not think she had osteomyelitis and could be treated nonoperatively. Discussed potential future bone biopsy vs peroneal tendon repair. Labs: WBC 9.4: CRP 0.9: ESR 33  06/22/2023: Re-consultation from Dr. Citlaly Calle. She was feeling better  08/17/2023: She had slight regression with pain burning and intermittent swelling  09/14/2023: She continued to have pain and limitations  10/12/2023: She feels much better and would like to return to work    HPI:   04/24/2023: Left ankle dog bite: Great Bobby:  04/24/2023: [facility] ED: Liquid Augmentin  05/11/2023: [facility] ED: Negative duplex ultrasound  05/22/2023: Initial visit: Left ankle pain    ROS/Meds/PSH/PMH/FH/SH: reviewed today    Tobacco:  reports that she has never smoked. She has never used smokeless tobacco.     Physical Examination:  Patient appears to be alert and oriented with acceptable appearance.   No obvious distress or SOB  CV: appears to have acceptable vascular color and capillary refill  Neuro: appears to have mostly intact light touch sensation   Skin: Healed medial and lateral ankle dog bite wounds; no redness; no drainage   MS: Standing: plantigrade: Gait full    Left = resolved anterior lateral to medial ankle soft-tissue neuralgia   Left = full ankle/foot motion; 5/5 strength; no instability or crepitance    XR: Left: Standing AP lateral mortise ankle AP oblique foot taken today with no gross apparent residual issues related to dog tooth bite  XR Impression:  As above      Reviewed Test/Records/Documents:   04/24/2023: [facility] ED: Reflects being attacked by a great Bobby and bitten in her left ankle: X-rays read as no acutely displaced fracture or dislocation and

## 2023-10-13 ENCOUNTER — TELEPHONE (OUTPATIENT)
Dept: ORTHOPEDIC SURGERY | Age: 38
End: 2023-10-13

## 2023-10-13 NOTE — TELEPHONE ENCOUNTER
Release to return to work with no restrictions was completed and faxed to Caleb Washington. Confirmation was received. Pt was called and notified. Pt voiced understanding. A copy was placed in the mail for pt, as requested.

## 2023-12-04 ENCOUNTER — TELEPHONE (OUTPATIENT)
Dept: ORTHOPEDIC SURGERY | Age: 38
End: 2023-12-04

## 2023-12-07 ENCOUNTER — OFFICE VISIT (OUTPATIENT)
Dept: ORTHOPEDIC SURGERY | Age: 38
End: 2023-12-07
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
## 2023-12-07 DIAGNOSIS — G90.522 COMPLEX REGIONAL PAIN SYNDROME TYPE 1 OF LEFT LOWER EXTREMITY: Primary | ICD-10-CM

## 2023-12-07 DIAGNOSIS — S91.052S: ICD-10-CM

## 2023-12-07 NOTE — PROGRESS NOTES
tenosynovitis  5. Increased signal within the spring ligament which may reflect age-indeterminate low-grade sprain  6. Mild thickening and increased signal of the origin of the central plantar fascia with associated mild edema of the adjacent calcaneus, which may reflect plantar fasciitis  7. Edema in the sinus Tarsi. Patient had a preliminary read by the on-call radiologist but over read and complete report by Dr. 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 We explored treatment options. She was recovering and back to work   12/03/2023, she reports after working 2 days in a row, unprovoked pop and pain in the posterior lateral ankle with resultant swelling  I suspect she has a peroneal tendon tear  05/30/2023: Left ankle MRI scan with split peroneus brevis tendon that Dr. Ke Shay did not feel needed surgery  New MRI scan to assess whether she has developed a complete tear. Medication: Continue topical and oral Neurontin    Advanced medical imaging: Left ankle/hindfoot MRI scan: Assess for peroneal tendon tear: 12/03/2023: Lateral ankle pop unprovoked: 05/11/2023 severe left ankle injury after great Bobby dog bite: 05/30/2023: Left ankle MRI scan for comparison    Surgical discussion: Surgery depends on MRI scan. She understands my upper extremity debilities. If peroneal tendon is torn, I will have her get with Dr. Ke Shay on surgical planning. Follow up: After MRI scan    Work status: Regular work: No restrictions    This note was created using Dragon voice recognition software which may result in errors of speech and spelling recognition and word/phrase syntax errors.

## 2023-12-11 ENCOUNTER — TELEPHONE (OUTPATIENT)
Dept: ORTHOPEDIC SURGERY | Age: 38
End: 2023-12-11

## 2023-12-11 NOTE — TELEPHONE ENCOUNTER
She wants to know the status of her MRI. She says you were trying to get it rushed. Please let her know.

## 2023-12-12 NOTE — TELEPHONE ENCOUNTER
Called and spoke to pt . I explain to the pt that the reason for the delay it because of her insurance. Pt stated that it shouldn't be going through he insurance that she has a  and the other party is responsible for paying it. I sent this information over to Ivette Calvillo and told the pt I would call her back once I have an answer.

## 2023-12-15 ENCOUNTER — TELEPHONE (OUTPATIENT)
Dept: ORTHOPEDIC SURGERY | Age: 38
End: 2023-12-15

## 2023-12-15 NOTE — TELEPHONE ENCOUNTER
Called and spoke to pt , pt  was explained that her insurance requires 14 days for an authorization I will check back in with her next week and update her on the status of her mri.

## 2024-01-04 NOTE — PROGRESS NOTES
[address] at [facility] 9/6/2023      Patient was unable to attend therapy this week secondary to a busy schedule/appointments.        Tete Seek PT, DPT, OCS
31

## 2024-01-08 ENCOUNTER — CLINICAL DOCUMENTATION (OUTPATIENT)
Dept: ORTHOPEDIC SURGERY | Age: 39
End: 2024-01-08

## 2024-01-08 NOTE — PROGRESS NOTES
01/04/2024: Left ankle MRI scan without contrast: Radiology impression:  1.  There is mild increased fluid in the posterior tibial tendon sheath without tendon tear or retraction, mild tenosynovitis  2.  The traumatic findings on prior have resolved    Under findings section: Radiologist references:  No fracture, dislocation or gross degenerative changes detected...  There is no evidence of osteochondral defect  The remaining portions of the flexor, extensor and peroneal tendons are within normal limits in signal intensity and morphology.  No evidence of tendon tear, degeneration or tenosynovitis is detected.

## 2024-01-09 ENCOUNTER — OFFICE VISIT (OUTPATIENT)
Dept: ORTHOPEDIC SURGERY | Age: 39
End: 2024-01-09
Payer: COMMERCIAL

## 2024-01-09 VITALS — WEIGHT: 170 LBS | HEIGHT: 63 IN | BODY MASS INDEX: 30.12 KG/M2

## 2024-01-09 DIAGNOSIS — M76.72 PERONEAL TENDINITIS OF LEFT LOWER EXTREMITY: Primary | ICD-10-CM

## 2024-01-09 PROCEDURE — 99213 OFFICE O/P EST LOW 20 MIN: CPT | Performed by: ORTHOPAEDIC SURGERY

## 2024-01-09 NOTE — PROGRESS NOTES
Name: Lisset Pulido  YOB: 1985  Gender: female  MRN: [id number]     01/09/2024: Here to discuss MRI scan    HPI:   04/24/2023: Left ankle dog bite: Great Bobby:  04/24/2023: [facility] ED: Liquid Augmentin  05/11/2023: [facility] ED: Negative duplex ultrasound  05/22/2023: Initial visit: Left ankle pain  05/31/2023: Presented to discuss MRI scan: She was better.  06/01/2023: Dr. Live: Reflects dog bite left ankle with fibular fracture and incidental peroneal brevis tear.  Dr. Live reflected that he did not think she had osteomyelitis and could be treated nonoperatively.  Discussed potential future bone biopsy vs peroneal tendon repair. Labs: WBC 9.4: CRP 0.9: ESR 33  06/22/2023: Re-consultation from Dr. Live.  She was feeling better  08/17/2023: She had slight regression with pain burning and intermittent swelling  09/14/2023: She continued to have pain and limitations  10/12/2023: She feels much better and would like to return to work    12/03/2023: At work she felt a pop in her posterior lateral ankle.  Bony images with peroneal area/lateral ankle swelling  12/07/2023: Initial visit: Left ankle pain    ROS/Meds/PSH/PMH/FH/SH: reviewed today    Tobacco:  reports that she has never smoked. She has never used smokeless tobacco.     Physical Examination:  Patient appears to be alert and oriented with acceptable appearance.  No obvious distress or SOB  CV: appears to have acceptable vascular color and capillary refill  Neuro: appears to have mostly intact light touch sensation   Skin: Healed medial and lateral ankle dog bite wounds; no redness; lateral malleolar/peroneal area soft tissue swelling mild  MS: Standing: plantigrade: Gait full     Left = posterolateral ankle/peroneal pain  Left = full ankle/foot motion; 5/5 strength; no instability or crepitance    XR: Left: Standing AP lateral mortise ankle AP oblique foot taken 12/07/2023, with no gross apparent residual issues related to dog tooth

## 2024-01-19 ENCOUNTER — OFFICE VISIT (OUTPATIENT)
Dept: ORTHOPEDIC SURGERY | Age: 39
End: 2024-01-19
Payer: COMMERCIAL

## 2024-01-19 DIAGNOSIS — M76.72 PERONEAL TENDINITIS OF LEFT LOWER EXTREMITY: ICD-10-CM

## 2024-01-19 DIAGNOSIS — M25.372 LEFT ANKLE INSTABILITY: Primary | ICD-10-CM

## 2024-01-19 PROCEDURE — 99214 OFFICE O/P EST MOD 30 MIN: CPT | Performed by: ORTHOPAEDIC SURGERY

## 2024-01-19 NOTE — PROGRESS NOTES
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)       HPI: Lisset Pulido is a 38 y.o. female who presents with New Patient (Left ankle/ foot, referral from [facility])  .  This patient presents the office today about 9 months after severe injury to her left ankle from an assault by a dog.  She has had ongoing pain located in her left ankle since that time.  She is tried 2 courses of physical therapy for this which is really got rid of most of her neuritic pain but continues to have persistent instability and pain.  She had a recent MRI which shows increasing fluid consistent with a peroneus brevis tendon tear as well.    History was obtained by Patient     ROS/Meds/PSH/PMH/FH/SH: I personally reviewed the patients standard intake form.  Below are the pertinents    Tobacco:  reports that she has never smoked. She has never used smokeless tobacco.  Diabetes: None      Physical Examination:  Physical exam shows increased talar tilt laxity neutral drawer laxity on the side compared to the contralateral extremity.  Is no evidence of cavovarus or generalized ligament laxity.  There are 2 puncture wounds that 1 located over the peroneus brevis which is a site of tenderness palpation and swelling in 1 over the ATFL region as well.  In addition to that I cannot find any pain over her sural nerve whatsoever and I do not think she has sural neuritis.  She has palpable pulses and intact sensation.      Imaging:   I independently interpreted an MRI ordered by a different physician, taken from an outside facility of the of the of the left ankle which by my read shows a peroneus brevis tendon tear and a insufficiency the ATFL and CFL.          Assessment:   Left chronic lateral ankle stability and peroneal tendon tear    Treatment Plan:   4 This is

## 2024-01-24 ENCOUNTER — CLINICAL DOCUMENTATION (OUTPATIENT)
Dept: ORTHOPEDIC SURGERY | Age: 39
End: 2024-01-24

## 2024-02-05 NOTE — PROGRESS NOTES
PLEASE CONTINUE TAKING ALL PRESCRIPTION MEDICATIONS UP TO THE DAY OF SURGERY UNLESS OTHERWISE DIRECTED BELOW. You may take Tylenol, allergy,  and/or indigestion medications.     TAKE ONLY THESE MEDICATIONS ON THE DAY OF SURGERY    Prevacid and bring albuterol inhaler   PT STATES CAN'T SWALLOW A PILL UNLESS USES APPLE SAUCE        DISCONTINUE all vitamins and supplements 7 days prior to surgery. DISCONTINUE Non-Steroidal Anti-Inflammatory (NSAIDS) such as Advil and Aleve 5 days prior to surgery.     Home Medications to Hold- please continue all other medications except these.    NONE        Comments      On the day before surgery please take 2 Tylenol in the morning and then again before bed. You may use either regular or extra strength.           Please do not bring home medications with you on the day of surgery unless otherwise directed by your nurse.  If you are instructed to bring home medications, please give them to your nurse as they will be administered by the nursing staff.    If you have any questions, please call [facility] [phone].    A copy of this note was provided to the patient for reference.

## 2024-02-05 NOTE — PERIOP NOTE
Preop department called to notify patient of arrival time for scheduled procedure. Instructions given to   - Arrive at [facility] Entrance [address]  - Remain NPO after midnight, unless otherwise indicated, including gum, mints, and ice chips.   - Have a responsible adult to drive patient to the hospital, stay during surgery, and patient will need supervision 24 hours after anesthesia.   - Use antibacterial soap in shower the night before surgery and on the morning of surgery.       Was patient contacted: pt  Voicemail left:   Numbers contacted: [phone]   Arrival time: 0900      yes

## 2024-02-06 ENCOUNTER — ANESTHESIA (OUTPATIENT)
Dept: SURGERY | Age: 39
End: 2024-02-06
Payer: COMMERCIAL

## 2024-02-06 ENCOUNTER — ANESTHESIA EVENT (OUTPATIENT)
Dept: SURGERY | Age: 39
End: 2024-02-06
Payer: COMMERCIAL

## 2024-02-06 ENCOUNTER — HOSPITAL ENCOUNTER (OUTPATIENT)
Age: 39
Setting detail: OUTPATIENT SURGERY
Discharge: HOME | End: 2024-02-06
Attending: ORTHOPAEDIC SURGERY | Admitting: ORTHOPAEDIC SURGERY
Payer: COMMERCIAL

## 2024-02-06 VITALS
RESPIRATION RATE: 27 BRPM | WEIGHT: 195 LBS | SYSTOLIC BLOOD PRESSURE: 116 MMHG | OXYGEN SATURATION: 88 % | DIASTOLIC BLOOD PRESSURE: 58 MMHG | TEMPERATURE: 98 F | HEIGHT: 62 IN | BODY MASS INDEX: 35.88 KG/M2 | HEART RATE: 91 BPM

## 2024-02-06 DIAGNOSIS — G89.18 ACUTE POST-OPERATIVE PAIN: Primary | ICD-10-CM

## 2024-02-06 LAB — HCG UR QL: NEGATIVE

## 2024-02-06 PROCEDURE — 6360000002 HC RX W HCPCS: Performed by: ANESTHESIOLOGY

## 2024-02-06 PROCEDURE — 3700000001 HC ADD 15 MINUTES (ANESTHESIA): Performed by: ORTHOPAEDIC SURGERY

## 2024-02-06 PROCEDURE — 64445 NJX AA&/STRD SCIATIC NRV IMG: CPT | Performed by: ANESTHESIOLOGY

## 2024-02-06 PROCEDURE — 7100000010 HC PHASE II RECOVERY - FIRST 15 MIN: Performed by: ORTHOPAEDIC SURGERY

## 2024-02-06 PROCEDURE — 6360000002 HC RX W HCPCS: Performed by: NURSE PRACTITIONER

## 2024-02-06 PROCEDURE — 6360000002 HC RX W HCPCS: Performed by: NURSE ANESTHETIST, CERTIFIED REGISTERED

## 2024-02-06 PROCEDURE — C1713 ANCHOR/SCREW BN/BN,TIS/BN: HCPCS | Performed by: ORTHOPAEDIC SURGERY

## 2024-02-06 PROCEDURE — 81025 URINE PREGNANCY TEST: CPT

## 2024-02-06 PROCEDURE — 3700000000 HC ANESTHESIA ATTENDED CARE: Performed by: ORTHOPAEDIC SURGERY

## 2024-02-06 PROCEDURE — 2580000003 HC RX 258: Performed by: ANESTHESIOLOGY

## 2024-02-06 PROCEDURE — 3600000014 HC SURGERY LEVEL 4 ADDTL 15MIN: Performed by: ORTHOPAEDIC SURGERY

## 2024-02-06 PROCEDURE — 7100000001 HC PACU RECOVERY - ADDTL 15 MIN: Performed by: ORTHOPAEDIC SURGERY

## 2024-02-06 PROCEDURE — 7100000000 HC PACU RECOVERY - FIRST 15 MIN: Performed by: ORTHOPAEDIC SURGERY

## 2024-02-06 PROCEDURE — 2709999900 HC NON-CHARGEABLE SUPPLY: Performed by: ORTHOPAEDIC SURGERY

## 2024-02-06 PROCEDURE — 3600000004 HC SURGERY LEVEL 4 BASE: Performed by: ORTHOPAEDIC SURGERY

## 2024-02-06 PROCEDURE — 64447 NJX AA&/STRD FEMORAL NRV IMG: CPT | Performed by: ANESTHESIOLOGY

## 2024-02-06 PROCEDURE — 2500000003 HC RX 250 WO HCPCS: Performed by: NURSE ANESTHETIST, CERTIFIED REGISTERED

## 2024-02-06 PROCEDURE — 6370000000 HC RX 637 (ALT 250 FOR IP): Performed by: ANESTHESIOLOGY

## 2024-02-06 DEVICE — DEVICE GRFT FIX FOR LIGMNT AUG ANCHR REP PEEK INT BRAC: Type: IMPLANTABLE DEVICE | Site: ANKLE | Status: FUNCTIONAL

## 2024-02-06 DEVICE — ANCHOR SUT NDL DX FIBERTAK: Type: IMPLANTABLE DEVICE | Site: ANKLE | Status: FUNCTIONAL

## 2024-02-06 RX ORDER — PROPOFOL 10 MG/ML
INJECTION, EMULSION INTRAVENOUS PRN
Status: DISCONTINUED | OUTPATIENT
Start: 2024-02-06 | End: 2024-02-06 | Stop reason: SDUPTHER

## 2024-02-06 RX ORDER — SODIUM CHLORIDE, SODIUM LACTATE, POTASSIUM CHLORIDE, CALCIUM CHLORIDE 600; 310; 30; 20 MG/100ML; MG/100ML; MG/100ML; MG/100ML
INJECTION, SOLUTION INTRAVENOUS CONTINUOUS
Status: DISCONTINUED | OUTPATIENT
Start: 2024-02-06 | End: 2024-02-06 | Stop reason: HOSPADM

## 2024-02-06 RX ORDER — SODIUM CHLORIDE 9 MG/ML
INJECTION, SOLUTION INTRAVENOUS PRN
Status: DISCONTINUED | OUTPATIENT
Start: 2024-02-06 | End: 2024-02-06 | Stop reason: HOSPADM

## 2024-02-06 RX ORDER — ONDANSETRON 2 MG/ML
4 INJECTION INTRAMUSCULAR; INTRAVENOUS
Status: DISCONTINUED | OUTPATIENT
Start: 2024-02-06 | End: 2024-02-06 | Stop reason: HOSPADM

## 2024-02-06 RX ORDER — LIDOCAINE HYDROCHLORIDE 10 MG/ML
1 INJECTION, SOLUTION INFILTRATION; PERINEURAL
Status: DISCONTINUED | OUTPATIENT
Start: 2024-02-06 | End: 2024-02-06 | Stop reason: HOSPADM

## 2024-02-06 RX ORDER — ACETAMINOPHEN 500 MG
1000 TABLET ORAL ONCE
Status: DISCONTINUED | OUTPATIENT
Start: 2024-02-06 | End: 2024-02-06 | Stop reason: HOSPADM

## 2024-02-06 RX ORDER — SODIUM CHLORIDE 0.9 % (FLUSH) 0.9 %
5-40 SYRINGE (ML) INJECTION EVERY 12 HOURS SCHEDULED
Status: DISCONTINUED | OUTPATIENT
Start: 2024-02-06 | End: 2024-02-06 | Stop reason: HOSPADM

## 2024-02-06 RX ORDER — SODIUM CHLORIDE 0.9 % (FLUSH) 0.9 %
5-40 SYRINGE (ML) INJECTION PRN
Status: DISCONTINUED | OUTPATIENT
Start: 2024-02-06 | End: 2024-02-06 | Stop reason: HOSPADM

## 2024-02-06 RX ORDER — FENTANYL CITRATE 50 UG/ML
100 INJECTION, SOLUTION INTRAMUSCULAR; INTRAVENOUS
Status: COMPLETED | OUTPATIENT
Start: 2024-02-06 | End: 2024-02-06

## 2024-02-06 RX ORDER — OXYCODONE HYDROCHLORIDE 5 MG/1
5 TABLET ORAL
Status: COMPLETED | OUTPATIENT
Start: 2024-02-06 | End: 2024-02-06

## 2024-02-06 RX ORDER — DEXAMETHASONE SODIUM PHOSPHATE 4 MG/ML
INJECTION, SOLUTION INTRA-ARTICULAR; INTRALESIONAL; INTRAMUSCULAR; INTRAVENOUS; SOFT TISSUE PRN
Status: DISCONTINUED | OUTPATIENT
Start: 2024-02-06 | End: 2024-02-06 | Stop reason: SDUPTHER

## 2024-02-06 RX ORDER — HYDROMORPHONE HYDROCHLORIDE 2 MG/ML
0.5 INJECTION, SOLUTION INTRAMUSCULAR; INTRAVENOUS; SUBCUTANEOUS EVERY 5 MIN PRN
Status: DISCONTINUED | OUTPATIENT
Start: 2024-02-06 | End: 2024-02-06 | Stop reason: HOSPADM

## 2024-02-06 RX ORDER — OXYCODONE HYDROCHLORIDE 5 MG/1
5 TABLET ORAL EVERY 6 HOURS PRN
Qty: 20 TABLET | Refills: 0 | Status: SHIPPED | OUTPATIENT
Start: 2024-02-06 | End: 2024-02-11

## 2024-02-06 RX ORDER — MIDAZOLAM HYDROCHLORIDE 2 MG/2ML
2 INJECTION, SOLUTION INTRAMUSCULAR; INTRAVENOUS
Status: COMPLETED | OUTPATIENT
Start: 2024-02-06 | End: 2024-02-06

## 2024-02-06 RX ORDER — PROCHLORPERAZINE EDISYLATE 5 MG/ML
5 INJECTION INTRAMUSCULAR; INTRAVENOUS
Status: DISCONTINUED | OUTPATIENT
Start: 2024-02-06 | End: 2024-02-06 | Stop reason: HOSPADM

## 2024-02-06 RX ORDER — CEPHALEXIN 500 MG/1
500 CAPSULE ORAL 4 TIMES DAILY
Qty: 12 CAPSULE | Refills: 0 | Status: SHIPPED | OUTPATIENT
Start: 2024-02-06

## 2024-02-06 RX ORDER — ONDANSETRON 2 MG/ML
INJECTION INTRAMUSCULAR; INTRAVENOUS PRN
Status: DISCONTINUED | OUTPATIENT
Start: 2024-02-06 | End: 2024-02-06 | Stop reason: SDUPTHER

## 2024-02-06 RX ORDER — LIDOCAINE HYDROCHLORIDE 20 MG/ML
INJECTION, SOLUTION EPIDURAL; INFILTRATION; INTRACAUDAL; PERINEURAL PRN
Status: DISCONTINUED | OUTPATIENT
Start: 2024-02-06 | End: 2024-02-06 | Stop reason: SDUPTHER

## 2024-02-06 RX ORDER — MEPERIDINE HYDROCHLORIDE 25 MG/ML
12.5 INJECTION INTRAMUSCULAR; INTRAVENOUS; SUBCUTANEOUS EVERY 5 MIN PRN
Status: DISCONTINUED | OUTPATIENT
Start: 2024-02-06 | End: 2024-02-06 | Stop reason: HOSPADM

## 2024-02-06 RX ORDER — ASPIRIN 81 MG/1
81 TABLET ORAL 2 TIMES DAILY
Qty: 42 TABLET | Refills: 0 | Status: SHIPPED | OUTPATIENT
Start: 2024-02-06

## 2024-02-06 RX ADMIN — OXYCODONE 5 MG: 5 TABLET ORAL at 13:47

## 2024-02-06 RX ADMIN — SODIUM CHLORIDE, POTASSIUM CHLORIDE, SODIUM LACTATE AND CALCIUM CHLORIDE: 600; 310; 30; 20 INJECTION, SOLUTION INTRAVENOUS at 10:35

## 2024-02-06 RX ADMIN — PROPOFOL 50 MG: 10 INJECTION, EMULSION INTRAVENOUS at 13:00

## 2024-02-06 RX ADMIN — FENTANYL CITRATE 50 MCG: 50 INJECTION INTRAMUSCULAR; INTRAVENOUS at 13:02

## 2024-02-06 RX ADMIN — PROPOFOL 280 MG: 10 INJECTION, EMULSION INTRAVENOUS at 12:36

## 2024-02-06 RX ADMIN — FENTANYL CITRATE 100 MCG: 50 INJECTION INTRAMUSCULAR; INTRAVENOUS at 11:41

## 2024-02-06 RX ADMIN — DEXAMETHASONE SODIUM PHOSPHATE 4 MG: 4 INJECTION, SOLUTION INTRAMUSCULAR; INTRAVENOUS at 13:13

## 2024-02-06 RX ADMIN — Medication 2000 MG: at 12:26

## 2024-02-06 RX ADMIN — MIDAZOLAM 2 MG: 1 INJECTION INTRAMUSCULAR; INTRAVENOUS at 11:41

## 2024-02-06 RX ADMIN — EPINEPHRINE 5 ML: 1 INJECTION, SOLUTION, CONCENTRATE INTRAVENOUS at 11:48

## 2024-02-06 RX ADMIN — EPINEPHRINE 8 ML: 1 INJECTION, SOLUTION, CONCENTRATE INTRAVENOUS at 11:45

## 2024-02-06 RX ADMIN — ONDANSETRON 4 MG: 2 INJECTION INTRAMUSCULAR; INTRAVENOUS at 13:14

## 2024-02-06 RX ADMIN — ROPIVACAINE HYDROCHLORIDE 22 ML: 5 INJECTION, SOLUTION EPIDURAL; INFILTRATION; PERINEURAL at 11:45

## 2024-02-06 RX ADMIN — ROPIVACAINE HYDROCHLORIDE 15 ML: 5 INJECTION, SOLUTION EPIDURAL; INFILTRATION; PERINEURAL at 11:48

## 2024-02-06 RX ADMIN — LIDOCAINE HYDROCHLORIDE 40 MG: 20 INJECTION, SOLUTION EPIDURAL; INFILTRATION; INTRACAUDAL; PERINEURAL at 12:36

## 2024-02-06 ASSESSMENT — PAIN DESCRIPTION - LOCATION: LOCATION: ANKLE

## 2024-02-06 ASSESSMENT — PAIN DESCRIPTION - DESCRIPTORS
DESCRIPTORS: ACHING;SORE
DESCRIPTORS: THROBBING

## 2024-02-06 ASSESSMENT — PAIN SCALES - GENERAL: PAINLEVEL_OUTOF10: 5

## 2024-02-06 ASSESSMENT — PAIN - FUNCTIONAL ASSESSMENT: PAIN_FUNCTIONAL_ASSESSMENT: 0-10

## 2024-02-06 ASSESSMENT — PAIN DESCRIPTION - ORIENTATION: ORIENTATION: LEFT

## 2024-02-06 NOTE — H&P
Outpatient Surgery History and Physical:  Lisset Pulido was seen and examined.    CHIEF COMPLAINT:   left ankle.     PE:   /83   Pulse 81   Temp 97.8 °F (36.6 °C) (Oral)   Resp 20   Ht 1.575 m (5' 2\")   Wt 88.5 kg (195 lb)   LMP 02/01/2024 (Exact Date)   BMI 35.67 kg/m²     Heart:   Regular rhythm      Lungs:  Are clear      Past Medical History:    Past Medical History:   Diagnosis Date    Adverse reaction to anesthetic agent     PER PT-- HAS HAD TO HAVE A BREATHING TREATMENT AFTER EVERY SURG. STATES SHE IS UNSURE OF R/T ANXIETY OR HER having trouble swallowing    Anxiety     Managed with meds     Asthma     prn inhaler--    DVT, lower extremity, distal (HCC) 04/2023    left ankle-- r/t dog bite-- was on plavix  for short time--then clot resolved    Dysphagia     throat stretched 12/2023    GERD (gastroesophageal reflux disease)     managed with meds     Left ankle pain     Seizures (HCC) 2019    x 1-- per pt was dx as syncopal episode-- per pt-- work up was neg    Sleep apnea     hx-- per pt-- no longer an issue since wt lost       Surgical History:   Past Surgical History:   Procedure Laterality Date    APPENDECTOMY      CHOLECYSTECTOMY  10/2022    DILATION AND CURETTAGE OF UTERUS      HEENT      S/P Tonsillectomy, EGD, Esophagoscopy Flexible with Lucas Dilation 02/05/2021 [facility]       Social History: Patient  reports that she has never smoked. She has never used smokeless tobacco. She reports that she does not drink alcohol and does not use drugs.    Family History:   Family History   Problem Relation Age of Onset    No Known Problems Father     No Known Problems Brother     No Known Problems Brother     Hypertension Mother        Allergies: Reviewed per EMR  Pantoprazole    Medications:    Prior to Admission medications    Medication Sig Start Date End Date Taking? Authorizing Provider   linaclotide (LINZESS) 145 MCG capsule Take 1 capsule by mouth daily as needed 12/6/23   Provider,

## 2024-02-06 NOTE — OP NOTE
Operative Note    Patient:Lisset Pulido  MRN: [id number]    Date Of Surgery: 2/6/2024    Surgeon: Elliott Cabrera MD    Assistant Surgeon: None    Pre Op Diagnosis:  Pre-Op Diagnosis Codes:     * Peroneal tendinitis of left lower extremity [M76.72]     * Left ankle instability [M25.372]      Post Op Diagnosis:   same    Procedures Performed:  Left ankle arthroscopy with extensive debridement, 20532  Left peroneus brevis tendon reconstruction, 30367  Left lateral ankle ligament reconstruction, Broström procedure, 90815    Implants:   Implant Name Type Inv. Item Serial No.  Lot No. LRB No. Used Action   ANCHOR SUT NDL DX FIBERTAK - [id number]  ANCHOR SUT NDL DX FIBERTAK  [employer] [id number] Left 2 Implanted   DEVICE GRFT FIX FOR LIGMNT AUG ANCHR REP PEEK INT BRAC - [id number]  DEVICE GRFT FIX FOR LIGMNT AUG ANCHR REP PEEK INT BRAC  [employer] [id number] Left 1 Implanted       Anesthesia:  General    Blood Loss:  minimal    Tourniquet:  Estimated calf 35 minutes    Pre Operative Abx:   Ancef 2g            Pre Operative Course:  Lisset Pulido is a 38 y.o. female who has a history of chronic left lateral ankle instability and peroneus brevis tendon tear    Operation In Detail:  Patient was evaluated in the preoperative area.  We had a long discussion about the procedure and postoperative protocols.  The patient was then brought back to the operating room suite and placed in the operating room table.  A timeout was taken to identify the patient, procedure being performed, and laterality.  After this the patient was prepped and draped in the normal sterile fashion using a Betadine solution and/or a ChloraPrep solution.  A timeout was then taken to identify the patient his name, date of birth, laterality, and procedure being performed.  We also identified allergies and any concerns about the operation.  Attention was then placed to the operative extremity.  During a preop surgical timeout

## 2024-02-06 NOTE — ANESTHESIA PROCEDURE NOTES
Peripheral Block    Patient location during procedure: pre-op  Reason for block: post-op pain management and at surgeon's request  Start time: 2/6/2024 11:41 AM  End time: 2/6/2024 11:45 AM  Staffing  Performed: anesthesiologist   Anesthesiologist: Quan Hirsch MD  Performed by: Quan Hirsch MD  Authorized by: Quan Hirsch MD    Preanesthetic Checklist  Completed: patient identified, IV checked, site marked, risks and benefits discussed, surgical/procedural consents, equipment checked, pre-op evaluation, timeout performed, anesthesia consent given, oxygen available and monitors applied/VS acknowledged  Peripheral Block   Patient position: supine  Prep: ChloraPrep  Provider prep: mask and sterile gloves  Patient monitoring: cardiac monitor, continuous pulse ox, oxygen, IV access, frequent blood pressure checks and responsive to questions  Block type: Sciatic  Popliteal  Laterality: left  Injection technique: single-shot  Guidance: nerve stimulator and ultrasound guided  Local infiltration: lidocaine  Infiltration strength: 1 %  Local infiltration: lidocaine  Dose: 3 mL    Needle   Needle type: insulated echogenic nerve stimulator needle   Needle gauge: 20 G  Needle localization: nerve stimulator and ultrasound guidance (minimal motor response at >0.4 mA)  Needle length: 10 cm  Assessment   Injection assessment: negative aspiration for heme, no paresthesia on injection, local visualized surrounding nerve on ultrasound and no intravascular symptoms  Slow fractionated injection: yes  Hemodynamics: stable  Real-time US image taken/store: yes  Outcomes: patient tolerated procedure well    Additional Notes  Risks/benefits/alternatives discussed including damage to nerve or muscle.  Needle inserted and placed in close proximity to the nerve under real time ultrasound guidance.  Ultrasound was used to visualize the spread of local anesthetic in close proximity to the nerve being blocked.  The nerve appeared

## 2024-02-06 NOTE — ANESTHESIA PRE PROCEDURE
Department of Anesthesiology  Preprocedure Note       Name:  Lisset Pulido   Age:  38 y.o.  :  1985                                          MRN:  [id number]         Date:  2024      Surgeon: Surgeon(s):  Elliott Cabrera III, MD    Procedure: Procedure(s):  Left ankle arthroscopy  Left lateral ankle ligament reconsruction  Peroneal tendon debridement/ reconstruction    Medications prior to admission:   Prior to Admission medications    Medication Sig Start Date End Date Taking? Authorizing Provider   linaclotide (LINZESS) 145 MCG capsule Take 1 capsule by mouth daily as needed 23   Thiago Ovalle MD   zolpidem (AMBIEN) 10 MG tablet Take 0.5 tablets by mouth at bedtime. 23   Thiago Ovalle MD   lansoprazole (PREVACID) 30 MG delayed release capsule Take 1 capsule by mouth in the morning and at bedtime    ProviderThiago MD   albuterol sulfate HFA (PROVENTIL;VENTOLIN;PROAIR) 108 (90 Base) MCG/ACT inhaler 1 puff as needed    ProviderThiago MD       Current medications:    Current Facility-Administered Medications   Medication Dose Route Frequency Provider Last Rate Last Admin   • ceFAZolin (ANCEF) 2000 mg in sterile water 20 mL IV syringe  2,000 mg IntraVENous On Call to OR Luly Baumann APRN - CNP       • lactated ringers IV soln infusion   IntraVENous Continuous Luly Baumann APRN - CNP       • sodium chloride flush 0.9 % injection 5-40 mL  5-40 mL IntraVENous 2 times per day Luly Baumann APRN - CNP       • sodium chloride flush 0.9 % injection 5-40 mL  5-40 mL IntraVENous PRN Luly Baumann APRN - CNP       • 0.9 % sodium chloride infusion   IntraVENous PRN Luly Baumann APRN - CNP       • lidocaine 1 % injection 1 mL  1 mL IntraDERmal Once PRN Quan Hirsch MD       • acetaminophen (TYLENOL) tablet 1,000 mg  1,000 mg Oral Once Quan Hirsch MD       • fentaNYL (SUBLIMAZE) injection 100 mcg  100 mcg IntraVENous Once

## 2024-02-06 NOTE — ANESTHESIA POSTPROCEDURE EVALUATION
Department of Anesthesiology  Postprocedure Note    Patient: Lisset Pulido  MRN: [id number]  YOB: 1985  Date of evaluation: 2/6/2024    Procedure Summary       Date: 02/06/24 Room / Location: [facility] OP OR 06 / [facility]    Anesthesia Start: 1226 Anesthesia Stop: 1336    Procedures:       Left ankle arthroscopy (Left: Ankle)      Left lateral ankle ligament reconsruction (Left: Ankle)      Peroneal tendon debridement/ reconstruction (Left: Ankle) Diagnosis:       Peroneal tendinitis of left lower extremity      Left ankle instability      (Peroneal tendinitis of left lower extremity [M76.72])      (Left ankle instability [M25.372])    Surgeons: Elliott Cabrera III, MD Responsible Provider: Quan Hirsch MD    Anesthesia Type: TIVA ASA Status: 3            Anesthesia Type: No value filed.    Abigail Phase I: Abigail Score: 8    Abigail Phase II: Abigail Score: 9    Anesthesia Post Evaluation    Patient location during evaluation: PACU  Patient participation: complete - patient participated  Level of consciousness: awake and alert  Airway patency: patent  Nausea & Vomiting: no nausea and no vomiting  Cardiovascular status: hemodynamically stable  Respiratory status: acceptable, nonlabored ventilation and spontaneous ventilation  Hydration status: euvolemic  Comments: BP (!) 116/58   Pulse 91   Temp 98 °F (36.7 °C) (Temporal)   Resp 27   Ht 1.575 m (5' 2\")   Wt 88.5 kg (195 lb)   LMP 02/01/2024 (Exact Date)   SpO2 (!) 88%   BMI 35.67 kg/m²     Multimodal analgesia pain management approach  Pain management: adequate and satisfactory to patient    No notable events documented.

## 2024-02-06 NOTE — ANESTHESIA PROCEDURE NOTES
Peripheral Block    Patient location during procedure: pre-op  Reason for block: post-op pain management and at surgeon's request  Start time: 2/6/2024 11:47 AM  End time: 2/6/2024 11:48 AM  Staffing  Performed: anesthesiologist   Anesthesiologist: Quan Hirsch MD  Performed by: Quan Hirsch MD  Authorized by: Quan Hirsch MD    Preanesthetic Checklist  Completed: patient identified, IV checked, site marked, risks and benefits discussed, surgical/procedural consents, equipment checked, pre-op evaluation, timeout performed, anesthesia consent given, oxygen available and monitors applied/VS acknowledged  Peripheral Block   Patient position: supine  Prep: ChloraPrep  Provider prep: mask and sterile gloves  Patient monitoring: cardiac monitor, continuous pulse ox, frequent blood pressure checks, IV access, oxygen and responsive to questions  Block type: Femoral  Adductor canal  Laterality: left  Injection technique: single-shot  Guidance: ultrasound guided  Local infiltration: lidocaine  Infiltration strength: 1 %  Local infiltration: lidocaine  Dose: 3 mL    Needle   Needle type: insulated echogenic nerve stimulator needle   Needle gauge: 20 G  Needle localization: ultrasound guidance  Needle length: 10 cm  Assessment   Injection assessment: negative aspiration for heme, no paresthesia on injection, no intravascular symptoms and local visualized surrounding nerve on ultrasound  Slow fractionated injection: yes  Hemodynamics: stable  Real-time US image taken/store: yes  Outcomes: patient tolerated procedure well    Additional Notes  Risks/benefits/alternatives discussed including damage to nerve or muscle.  Needle inserted and placed in close proximity to the nerve under real time ultrasound guidance.  Ultrasound was used to visualize the spread of local anesthetic in close proximity to the nerve being blocked.  The nerve appeared anatomically normal and there were no abnormal findings. A permanent ultrasound

## 2024-02-07 ENCOUNTER — CLINICAL DOCUMENTATION (OUTPATIENT)
Dept: ORTHOPEDIC SURGERY | Age: 39
End: 2024-02-07

## 2024-02-16 ENCOUNTER — CLINICAL DOCUMENTATION (OUTPATIENT)
Dept: ORTHOPEDIC SURGERY | Age: 39
End: 2024-02-16

## 2024-02-23 ENCOUNTER — OFFICE VISIT (OUTPATIENT)
Dept: ORTHOPEDIC SURGERY | Age: 39
End: 2024-02-23

## 2024-02-23 DIAGNOSIS — G90.522 COMPLEX REGIONAL PAIN SYNDROME TYPE 1 OF LEFT LOWER EXTREMITY: ICD-10-CM

## 2024-02-23 DIAGNOSIS — M76.72 PERONEAL TENDINITIS OF LEFT LOWER EXTREMITY: ICD-10-CM

## 2024-02-23 DIAGNOSIS — M25.372 LEFT ANKLE INSTABILITY: Primary | ICD-10-CM

## 2024-02-23 PROCEDURE — 99024 POSTOP FOLLOW-UP VISIT: CPT | Performed by: NURSE PRACTITIONER

## 2024-02-23 PROCEDURE — M5002 MISC BOOT SOCK: HCPCS | Performed by: NURSE PRACTITIONER

## 2024-02-23 NOTE — PROGRESS NOTES
Patient was given an extra Boot Sock.  The patient was prescribed a walker boot for the patient's left foot. The patient wears a size 7.5 shoe and I fitted them with a S size boot. The patient was fitted and instructed on the use of prescribed walker boot. I explained how to fit themselves and that the plastic flexible piece should always be on the front of the boot and secured by the Velcro straps on top. The air bladder in the boot was adjusted according to proper fit and comfort. The patient walked a short distance and acknowledged satisfaction with current fit. I also explained that they need a heel lift or a higher heeled shoe for the uninvolved LE to help normalize gait and avoid excessive low back stress/strain due to leg length inequality created from walker boot.Patient read and signed documenting they understand and agree to [facility]'s current DME return policy.

## 2024-02-23 NOTE — PROGRESS NOTES
Name: Lisset Pulido  YOB: 1985  Gender: female  MRN: [id number]    Procedure Performed:  Left ankle arthroscopy with extensive debridement  Left peroneus brevis tendon reconstruction  Left lateral ankle ligament reconstruction        Date of Procedure: 02/06/2024      Subjective: Patient reports she is been very cautious since her surgery, she is happy about progressing with her recovery.  She does want to make sure she is doing everything correctly so she does not have a setback.      Physical Examination: Incisional areas all look great to the lateral and anterior ankle without signs of infection today.  They appear to be healing well.  She has palpable pulses and intact sensation to the foot.  She has noted swelling of the affected extremity.  The ankle joint is stable through talar tilt and anterior drawer testing both of which cause the patient some discomfort today.  She has no signs of DVT at this time, denies having any calf or behind the knee pain and does present with a negative Homans' sign.  She is actively able to invert and keely the foot minimally today without pain.        Imaging:   No imaging reviewed          Assessment:   Status post left ankle arthroscopy with Broström procedure and peroneus brevis tendon reconstruction.  Discussed progression of care today as well as expectations until the next follow-up visit.  Questions concerns were addressed, she verbalized understanding of today's conversation.      Plan:   3 This is stable chronic illness/condition  Treatment at this time: Sutures removed, Steri-Strips were placed.  Patient will be placed into a cam walker boot as a matter medical necessity where she may progressively bear weight on the affected extremity she can tolerate and swelling allows. The affected extremity may now get wet including showering, encouragement was given for the patient to soak with Epsom salts to help with additional incisional healing and

## 2024-02-27 RX ORDER — ASPIRIN 81 MG/1
TABLET, COATED ORAL
Qty: 42 TABLET | Refills: 0 | OUTPATIENT
Start: 2024-02-27

## 2024-03-20 ENCOUNTER — CLINICAL DOCUMENTATION (OUTPATIENT)
Dept: ORTHOPEDIC SURGERY | Age: 39
End: 2024-03-20

## 2024-03-27 ENCOUNTER — OFFICE VISIT (OUTPATIENT)
Dept: ORTHOPEDIC SURGERY | Age: 39
End: 2024-03-27

## 2024-03-27 DIAGNOSIS — M25.372 LEFT ANKLE INSTABILITY: Primary | ICD-10-CM

## 2024-03-27 DIAGNOSIS — M76.72 PERONEAL TENDINITIS OF LEFT LOWER EXTREMITY: ICD-10-CM

## 2024-03-27 PROCEDURE — 99024 POSTOP FOLLOW-UP VISIT: CPT | Performed by: NURSE PRACTITIONER

## 2024-03-27 NOTE — PROGRESS NOTES
Name: Lisset Pulido  YOB: 1985  Gender: female  MRN: [id number]    Procedure Performed:  Left ankle arthroscopy with extensive debridement  Left peroneus brevis tendon reconstruction  Left lateral ankle ligament reconstruction           Date of Procedure: 02/06/2024      Subjective: Patient reports that she is not physically able to walk in the boot for any length of time with out the crutches.  This sensation she describes as the a lightening bolt shocking sensation through the heel with every step she takes.      Physical Examination: All incisional areas are well-healed look great.  There is some noted swelling still to the anterior and lateral ankles.  She is able to invert and keely the foot with minor discomfort today.  She does have tenderness with plantarflexion of the foot.  She has palpable pulses and intact sensation to the foot.  The ankle joint is stable to talar tilt testing as well as anterior drawer testing.        Imaging:   No imaging reviewed          Assessment:   Status post left ankle arthroscopy with peroneus brevis tendon reconstruction and Broström procedure.      Plan:   3 This is stable chronic illness/condition  Treatment at this time: Patient may begin to wean from the walker boot back in comfortable shoes as she can tolerate and swelling allows.  She was given information regarding a lace up ankle brace to help with this transition process if needed ultimately using the ASO brace for strenuous activities.  She will begin physical therapy, an order was given today as well as a list of locations for her to choose from.  She will also begin diligent exercises at home to help increase strength and mobility of the affected foot and ankle.  Physical activities may be resumed as tolerated excluding high-impact at this time.  The patient will follow-up in approximately 6 weeks or sooner if needed.    Studies ordered: NO XR needed @ Next Visit    Weight-bearing status:

## 2024-04-01 NOTE — THERAPY EVALUATION
Lisset Pulido  : 1985  Primary: [facility] (Medicaid Managed)  Secondary:  [facility] [place]  [address]  [facility] [address]  Phone: [phone]  Fax: [phone] Plan Frequency: 1-2x per week for 12 weeks    Plan of Care/Certification Expiration Date: 24        Plan of Care/Certification Expiration Date:  Plan of Care/Certification Expiration Date: 24    Frequency/Duration: Plan Frequency: 1-2x per week for 12 weeks      Time In/Out:   Time In: 952  Time Out: 1053      PT Visit Info:         Visit Count:  1                OUTPATIENT PHYSICAL THERAPY:             Initial Assessment 4/3/2024               Episode (L ankle scope, peroneus brevis reconstruction, Broström)         Treatment Diagnosis:    Pain in left ankle and joints of left foot  Other instability, left ankle  Other abnormalities of gait and mobility  Medical/Referring Diagnosis:    Left ankle instability [M25.372]  Peroneal tendinitis of left lower extremity [M76.72]      Referring Physician:  Luly Baumann, APRN - CNP  MD Orders:  PT Eval and Treat; L ankle and nerve desensitization  Return MD Appt:  24  Date of Onset:  Onset Date: 24 (L ankle scope, peroneus brevis reconstruction, Brostom procedure)     Allergies:  Pantoprazole  Restrictions/Precautions:    Post Surgical Precautions: L peroneus brevis reconstruction, Brostrom procedure      Medications Last Reviewed:  4/3/2024     SUBJECTIVE   History of Injury/Illness (Reason for Referral):  Patient reports she was bit by a large dog last summer and hurt her ankle. She went through extensive therapy and eventually went back to work but had issues with swelling. She later had an MRI and saw Dr. Cabrera who recommended surgery to repair the tendon and do a Brostrom procedure. She does have some sensitivity in her foot/ankle that is reminiscent of the CRPS last summer. She feels very weak.

## 2024-04-01 NOTE — PROGRESS NOTES
visual, verbal, manual, and tactile cues to promote proper body alignment, promote proper body posture, and promote proper body mechanics.  Progressed resistance, range, repetitions, and complexity of movement as indicated.   Date:  4/3/24 Date:   Date:     Activity/Exercise Parameters Parameters Parameters   Ankle pumps Supine, elevated 2x30     Isometric Ankle strength Neutral ankle/foot:  5sx5 Inversion, eversion (submax), plantarflexion, dorsiflexion     Foot intrinsic strength Toe abduction x30    Toe curls x30     Standing weight shifting Feet hip width, UE support on table gentle sway R/L and A/P x2 mins each     Graded Motor Imagery R/L discrimination via VHSquared Isatu                          Time spent with patient reviewing proper muscle recruitment and technique with exercises.     MODALITIES: (0 minutes):        None today      HEP: As above; handouts given to patient for all exercises.      Treatment/Session Summary:    Treatment Assessment:   Patient tolerates initial examination and HEP well today. She would benefit from continued skilled therapy to address remaining deficits.   Communication/Consultation:  Therapy Evaluation sent to referring provider  Equipment provided today:  HEP  Recommendations/Intent for next treatment session: Next visit will focus on graded motor imagery progression, gentle ankle ROM/strength, progression in weightbearing.    >Total Treatment Billable Duration:  25 minutes evaluation  Time In: 0952  Time Out: 1053    Lizeth Lopez PT         Charge Capture  [employer] Portal  Appt Desk     Future Appointments   Date Time Provider Department Center   4/11/2024 11:00 AM Lizeth Lopez PT [facility]   4/12/2024  9:00 AM Lizeth Lopez PT [facility]   4/17/2024 11:00 AM Lizeth Lopez PT [facility]   4/17/2024  3:30 PM Carlos Benitez MD [facility]   4/19/2024 10:00 AM Lizeth Lopez PT [facility]   4/23/2024 10:00 AM Lizeth Lopez PT [facility]   4/25/2024 10:00

## 2024-04-03 ENCOUNTER — HOSPITAL ENCOUNTER (OUTPATIENT)
Dept: PHYSICAL THERAPY | Age: 39
Setting detail: RECURRING SERIES
Discharge: HOME | End: 2024-04-06
Payer: COMMERCIAL

## 2024-04-03 DIAGNOSIS — M25.372 OTHER INSTABILITY, LEFT ANKLE: ICD-10-CM

## 2024-04-03 DIAGNOSIS — R26.89 OTHER ABNORMALITIES OF GAIT AND MOBILITY: ICD-10-CM

## 2024-04-03 DIAGNOSIS — M25.572 PAIN IN LEFT ANKLE AND JOINTS OF LEFT FOOT: Primary | ICD-10-CM

## 2024-04-03 PROCEDURE — 97161 PT EVAL LOW COMPLEX 20 MIN: CPT

## 2024-04-03 ASSESSMENT — PAIN SCALES - GENERAL: PAINLEVEL_OUTOF10: 7

## 2024-04-04 NOTE — PROGRESS NOTES
Exercises per grid below to improve mobility, strength, balance, and coordination.  Required moderate visual, verbal, manual, and tactile cues to promote proper body alignment, promote proper body posture, and promote proper body mechanics.  Progressed resistance, range, repetitions, and complexity of movement as indicated.   Date:  4/3/24 Date:  4/11/24 Date:     Activity/Exercise Parameters Parameters Parameters   Ankle pumps Supine, elevated 2x30 Supine, elevated 2x30    Isometric Ankle strength Neutral ankle/foot:  5sx5 Inversion, eversion (submax), plantarflexion, dorsiflexion Neutral ankle/foot:  5sx10 Inversion, eversion (submax), plantarflexion, dorsiflexion    Foot intrinsic strength Toe abduction x30    Toe curls x30 Toe abduction x30    Toe curls x30    Standing weight shifting Feet hip width, UE support on table gentle sway R/L and A/P x2 mins each Feet hip width, UE support on table gentle sway R/L and A/P 2x2 mins each    Step through LLE x2 mins    Graded Motor Imagery R/L discrimination via Recognise Isatu  R/L discrimination via Recognise Isatu     Passive ankle ROM  Gentle stretch into DF, PF, eversion; gentle scar mobility    Ankle alphabet  X2 elevated    Seated weightbearing through elbows/knees without boot  X2 mins hold      Time spent with patient reviewing proper muscle recruitment and technique with exercises.     MODALITIES: (0 minutes):        None today      HEP: As above; handouts given to patient for all exercises.      Treatment/Session Summary:    Treatment Assessment:   Patient demonstrates excellent tolerance to progression in therapy today. Scar mobility and ankle mobility with nice gains. She would benefit from continued skilled therapy.   Communication/Consultation:  None today  Equipment provided today:  HEP  Recommendations/Intent for next treatment session: Next visit will focus on graded motor imagery progression, gentle ankle ROM/strength, progression in

## 2024-04-11 ENCOUNTER — HOSPITAL ENCOUNTER (OUTPATIENT)
Dept: PHYSICAL THERAPY | Age: 39
Setting detail: RECURRING SERIES
Discharge: HOME | End: 2024-04-14
Payer: COMMERCIAL

## 2024-04-11 PROCEDURE — 97110 THERAPEUTIC EXERCISES: CPT

## 2024-04-11 ASSESSMENT — PAIN SCALES - GENERAL: PAINLEVEL_OUTOF10: 5

## 2024-04-11 NOTE — PROGRESS NOTES
tolerance to progression into brace/shoe today. Advised home performance of weight shifting with shoe/brace to improve tolerance. She would benefit from continued skilled therapy.   Communication/Consultation:  None today  Equipment provided today:  HEP  Recommendations/Intent for next treatment session: Next visit will focus on graded motor imagery progression, gentle ankle ROM/strength, progression in weightbearing.    >Total Treatment Billable Duration:  55 minutes  Time In: 0858  Time Out: 0957    Lizeth Lopez PT         Charge Capture  [employer] Portal  Appt Desk     Future Appointments   Date Time Provider Department Center   4/17/2024 11:00 AM Lizeth Lopez, PT [facility]   4/17/2024  3:30 PM Carlos Benitez MD [facility]   4/19/2024 10:00 AM Lizeth Lopez, PT [facility]   4/23/2024 10:00 AM Lizeth Lopez, PT [facility]   4/25/2024 10:00 AM Lizeth Lopez, PT [facility]   4/30/2024  9:00 AM Lizeth Lopez, PT [facility]   5/2/2024 10:00 AM Lizeth Lopez, PT [facility]   5/7/2024 10:00 AM Lizeth Lopez, PT [facility]   5/8/2024 10:40 AM Luly Baumann APRN - CNP [facility]   5/9/2024 10:00 AM Lizeth Lopez, PT [facility]   5/14/2024 10:00 AM Lizeth Lopez, PT [facility]   5/29/2024 10:00 AM Lizeth Lopez, PT [facility]

## 2024-04-12 ENCOUNTER — HOSPITAL ENCOUNTER (OUTPATIENT)
Dept: PHYSICAL THERAPY | Age: 39
Setting detail: RECURRING SERIES
Discharge: HOME | End: 2024-04-15
Payer: COMMERCIAL

## 2024-04-12 ENCOUNTER — CLINICAL DOCUMENTATION (OUTPATIENT)
Dept: ORTHOPEDIC SURGERY | Age: 39
End: 2024-04-12

## 2024-04-12 PROCEDURE — 97110 THERAPEUTIC EXERCISES: CPT

## 2024-04-12 ASSESSMENT — PAIN SCALES - GENERAL: PAINLEVEL_OUTOF10: 5

## 2024-04-12 NOTE — PROGRESS NOTES
Lisset Pulido  : 1985  Primary: [facility] (Medicaid Managed)  Secondary:  [facility] [place]  [address]  [facility] [address]  Phone: [phone]  Fax: [phone] Plan Frequency: 1-2x per week for 12 weeks    Plan of Care/Certification Expiration Date: 24        Plan of Care/Certification Expiration Date:  Plan of Care/Certification Expiration Date: 24    Frequency/Duration: Plan Frequency: 1-2x per week for 12 weeks      Time In/Out:   Time In: 1057  Time Out: 1152      PT Visit Info:         Visit Count:  4    OUTPATIENT PHYSICAL THERAPY:   Treatment Note 2024       Episode  (L ankle scope, peroneus brevis reconstruction, Broström)               Treatment Diagnosis:    Pain in left ankle and joints of left foot  Other instability, left ankle  Other abnormalities of gait and mobility  Medical/Referring Diagnosis:    Left ankle instability [M25.372]  Peroneal tendinitis of left lower extremity [M76.72]      Referring Physician:  Luly Baumann, APRN - CNP  MD Orders:  PT Eval and Treat; L Ankle and nerve desensitization  Return MD Appt:  24  Date of Onset:  Onset Date: 24 (L ankle scope, peroneus brevis reconstruction, Brostom procedure)     Allergies:   Pantoprazole  Restrictions/Precautions:   Post Surgical Precautions: L peroneus brevis reconstruction, Brostrom procedure      Interventions Planned (Treatment may consist of any combination of the following):     See Assessment Note    Subjective Comments:   Sade reports doing well spending more time in the shoe/ankle brace. The brace donning/doffing is still a process but she's getting better at it.   Initial Pain Level::     3/10  Post Session Pain Level:       2/10  Medications Last Reviewed:  2024  Updated Objective Findings:   Improved scar mobility; no sensitivity present today, no edema  Treatment   THERAPEUTIC EXERCISE: (53 minutes):

## 2024-04-17 ENCOUNTER — HOSPITAL ENCOUNTER (OUTPATIENT)
Dept: PHYSICAL THERAPY | Age: 39
Setting detail: RECURRING SERIES
Discharge: HOME | End: 2024-04-20
Payer: COMMERCIAL

## 2024-04-17 PROCEDURE — 97110 THERAPEUTIC EXERCISES: CPT

## 2024-04-17 ASSESSMENT — PAIN SCALES - GENERAL: PAINLEVEL_OUTOF10: 3

## 2024-04-17 NOTE — PROGRESS NOTES
Lisset Pulido  : 1985  Primary: [facility] (Medicaid Managed)  Secondary:  [facility] [place]  [address]  [facility] [address]  Phone: [phone]  Fax: [phone] Plan Frequency: 1-2x per week for 12 weeks    Plan of Care/Certification Expiration Date: 24        Plan of Care/Certification Expiration Date:  Plan of Care/Certification Expiration Date: 24    Frequency/Duration: Plan Frequency: 1-2x per week for 12 weeks      Time In/Out:   Time In: 09  Time Out: 1053      PT Visit Info:         Visit Count:  5    OUTPATIENT PHYSICAL THERAPY:   Treatment Note 2024       Episode  (L ankle scope, peroneus brevis reconstruction, Broström)               Treatment Diagnosis:    Pain in left ankle and joints of left foot  Other instability, left ankle  Other abnormalities of gait and mobility  Medical/Referring Diagnosis:    Left ankle instability [M25.372]  Peroneal tendinitis of left lower extremity [M76.72]      Referring Physician:  Luly Baumann, APRN - CNP  MD Orders:  PT Eval and Treat; L Ankle and nerve desensitization  Return MD Appt:  24  Date of Onset:  Onset Date: 24 (L ankle scope, peroneus brevis reconstruction, Brostom procedure)     Allergies:   Pantoprazole  Restrictions/Precautions:   Post Surgical Precautions: L peroneus brevis reconstruction, Brostrom procedure      Interventions Planned (Treatment may consist of any combination of the following):     See Assessment Note    Subjective Comments:   Patient reports she is doing pretty well weaning from the boot. She feels more comfortable in the brace/shoe at this point.   Initial Pain Level::     5/10  Post Session Pain Level:       3/10  Medications Last Reviewed:  2024  Updated Objective Findings:   Improved gait quality today compared to last visit; still slightly antalgic  Treatment   THERAPEUTIC EXERCISE: (53 minutes):    Exercises per

## 2024-04-19 ENCOUNTER — HOSPITAL ENCOUNTER (OUTPATIENT)
Dept: PHYSICAL THERAPY | Age: 39
Setting detail: RECURRING SERIES
Discharge: HOME | End: 2024-04-22
Payer: COMMERCIAL

## 2024-04-19 PROCEDURE — 97110 THERAPEUTIC EXERCISES: CPT

## 2024-04-19 ASSESSMENT — PAIN SCALES - GENERAL: PAINLEVEL_OUTOF10: 5

## 2024-04-30 NOTE — PROGRESS NOTES
Lisset Pulido  : 1985  Primary: [facility] (Medicaid Managed)  Secondary:  [facility] [place]  [address]  [facility] [address]  Phone: [phone]  Fax: [phone] Plan Frequency: 1-2x per week for 12 weeks    Plan of Care/Certification Expiration Date: 24        Plan of Care/Certification Expiration Date:  Plan of Care/Certification Expiration Date: 24    Frequency/Duration: Plan Frequency: 1-2x per week for 12 weeks      Time In/Out:   Time In: 1007  Time Out: 1101      PT Visit Info:         Visit Count:  6    OUTPATIENT PHYSICAL THERAPY:   Treatment Note 2024       Episode  (L ankle scope, peroneus brevis reconstruction, Broström)               Treatment Diagnosis:    Pain in left ankle and joints of left foot  Other instability, left ankle  Other abnormalities of gait and mobility  Medical/Referring Diagnosis:    Left ankle instability [M25.372]  Peroneal tendinitis of left lower extremity [M76.72]      Referring Physician:  Luly Baumann, APRN - CNP  MD Orders:  PT Eval and Treat; L Ankle and nerve desensitization  Return MD Appt:  24  Date of Onset:  Onset Date: 24 (L ankle scope, peroneus brevis reconstruction, Brostom procedure)     Allergies:   Pantoprazole  Restrictions/Precautions:   Post Surgical Precautions: L peroneus brevis reconstruction, Brostrom procedure      Interventions Planned (Treatment may consist of any combination of the following):     See Assessment Note    Subjective Comments:   Patient reports she was sick with a stomach bug and had to spend a lot of time in bed. Her ankle got very stiff and even swollen one day. She feels she took a step backward because now wearing the brace is painful and she's been using the boot some.   Initial Pain Level::     4/10  Post Session Pain Level:       2/10  Medications Last Reviewed:  2024  Updated Objective Findings:   Decreased

## 2024-05-02 ENCOUNTER — HOSPITAL ENCOUNTER (OUTPATIENT)
Dept: PHYSICAL THERAPY | Age: 39
Setting detail: RECURRING SERIES
Discharge: HOME | End: 2024-05-05
Payer: COMMERCIAL

## 2024-05-02 PROCEDURE — 97110 THERAPEUTIC EXERCISES: CPT

## 2024-05-02 ASSESSMENT — PAIN SCALES - GENERAL: PAINLEVEL_OUTOF10: 4

## 2024-05-02 NOTE — PROGRESS NOTES
Lisset Pulido  : 1985  Primary: [facility] (Medicaid Managed)  Secondary:  [facility] [place]  [address]  [facility] [address]  Phone: [phone]  Fax: [phone] Plan Frequency: 1-2x per week for 12 weeks    Plan of Care/Certification Expiration Date: 24        Plan of Care/Certification Expiration Date:  Plan of Care/Certification Expiration Date: 24    Frequency/Duration: Plan Frequency: 1-2x per week for 12 weeks      Time In/Out:   Time In: 959  Time Out: 1057      PT Visit Info:         Visit Count:  7    OUTPATIENT PHYSICAL THERAPY:   Treatment Note 2024       Episode  (L ankle scope, peroneus brevis reconstruction, Broström)               Treatment Diagnosis:    Pain in left ankle and joints of left foot  Other instability, left ankle  Other abnormalities of gait and mobility  Medical/Referring Diagnosis:    Left ankle instability [M25.372]  Peroneal tendinitis of left lower extremity [M76.72]      Referring Physician:  Luly Baumann, APRN - CNP  MD Orders:  PT Eval and Treat; L Ankle and nerve desensitization  Return MD Appt:  24  Date of Onset:  Onset Date: 24 (L ankle scope, peroneus brevis reconstruction, Brostom procedure)     Allergies:   Pantoprazole  Restrictions/Precautions:   Post Surgical Precautions: L peroneus brevis reconstruction, Brostrom procedure      Interventions Planned (Treatment may consist of any combination of the following):     See Assessment Note    Subjective Comments:   Patient reports things have continued to improve but she's had a few sore/painful days. She is no longer using the boot and only uses the brace for uneven surfaces.   Initial Pain Level::     6/10  Post Session Pain Level:       4/10  Medications Last Reviewed:  2024  Updated Objective Findings:   See Progress Note from today  Treatment   THERAPEUTIC EXERCISE: (53 minutes):    Exercises per grid 
ONGOING  Patient will demonstrate SLS of 10s or greater on LLE. ONGOING  Patient will improve FAAM to 53 points or greater. NEW GOAL       Outcome Measure:   Tool Used: FOOT AND ANKLE ABILITY MEASURE  Score:  Initial: 15 Most Recent: 40 (Date: 5/7/24 )   Interpretation of Score: For the \"Activities of Daily Living\", there are 21 questions each scored on a 5 point scale with 0 representing \"Unable to do\" and 4 representing \"No difficulty\".  The lower the score, the greater the functional disability. 84/84 represents no disability.  Minimal detectable change is 5.7 points.  With the addition of the 8 questions in the \"Sports Subscale,\" there are 29 questions, each scored on a 5 point scale with 0 representing \"Unable to do\" and 4 representing \"No difficulty\".  The lower the score, the greater the functional disability. 116/116 represents no disability.  Minimal detectable change is 12.3 points.    Medical Necessity:   > Patient is expected to demonstrate progress in strength, range of motion, balance, coordination, and functional technique to improve safety during mobility related ADLs.  > Skilled intervention continues to be required due to pain, weakness limiting function.  Reason For Services/Other Comments:  > Patient continues to require skilled intervention due to pain, weakness limiting function.      Regarding Lisset Pulido's therapy, I certify that the treatment plan above will be carried out by a therapist or under their direction.  Thank you for this referral,  Lzieth Lopez, PT     Referring Physician Signature: Luly Baumann, * No Signature is Required for this note.        Charge Capture  Appt Desk

## 2024-05-06 ENCOUNTER — CLINICAL DOCUMENTATION (OUTPATIENT)
Dept: ORTHOPEDIC SURGERY | Age: 39
End: 2024-05-06

## 2024-05-07 ENCOUNTER — HOSPITAL ENCOUNTER (OUTPATIENT)
Dept: PHYSICAL THERAPY | Age: 39
Setting detail: RECURRING SERIES
Discharge: HOME | End: 2024-05-10
Payer: COMMERCIAL

## 2024-05-07 PROCEDURE — 97110 THERAPEUTIC EXERCISES: CPT

## 2024-05-07 ASSESSMENT — PAIN SCALES - GENERAL: PAINLEVEL_OUTOF10: 6

## 2024-05-08 ENCOUNTER — OFFICE VISIT (OUTPATIENT)
Dept: ORTHOPEDIC SURGERY | Age: 39
End: 2024-05-08

## 2024-05-08 DIAGNOSIS — M76.72 PERONEAL TENDINITIS OF LEFT LOWER EXTREMITY: ICD-10-CM

## 2024-05-08 DIAGNOSIS — M25.372 LEFT ANKLE INSTABILITY: Primary | ICD-10-CM

## 2024-05-08 PROCEDURE — 99024 POSTOP FOLLOW-UP VISIT: CPT | Performed by: NURSE PRACTITIONER

## 2024-05-08 NOTE — PROGRESS NOTES
Name: Lisset Pulido  YOB: 1985  Gender: female  MRN: [id number]    Procedure Performed:  Left ankle arthroscopy with extensive debridement  Left peroneus brevis tendon reconstruction  Left lateral ankle ligament reconstruction           Date of Procedure: 02/06/2024     Subjective: Patient reports that she has progressed well since her last visit.  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  She notes that therapy is been helpful and that she been working at home to also help regain strength and mobility.  She will continue with therapy at this time.      Physical Examination: Incisional areas are well-healed.  There are no signs of infection to the foot or ankle today.  There is mild swelling still to the lateral ankle.  She does have some sensitivity and tenderness over the sinus Tarsi region and there is some fullness with palpation to this area.  The ankle joint stable to talar tilt and anterior drawer testing.  She is able to do a double leg toe raise with some effort today however she is not physically able to perform a single-leg toe raise just yet.  Range of motion to the ankle joint today was performed both active and passively without difficulty or pain.        Imaging:   No imaging reviewed          Assessment:   Status post left ankle arthroscopy with peroneus brevis tendon reconstruction and Broström procedure.  I believe her areas of discomfort of the sinus Tarsi region are still due to lack of strength at this time.  There is a visible difference in her calf sizes still at 3 months postop.  She will continue to work with therapy as well as doing diligent exercises at home.      Plan:   3 This is stable chronic illness/condition  Treatment at this time: Time with no intervention and Physical Therapy, she will follow-up in 3 months or sooner if

## 2024-05-09 NOTE — PROGRESS NOTES
Lisset Pulido  : 1985  Primary: [facility] (Medicaid Managed)  Secondary:  [facility] [place]  [address]  [facility] [address]  Phone: [phone]  Fax: [phone] Plan Frequency: 1-2x per week for 12 weeks    Plan of Care/Certification Expiration Date: 24        Plan of Care/Certification Expiration Date:  Plan of Care/Certification Expiration Date: 24    Frequency/Duration: Plan Frequency: 1-2x per week for 12 weeks      Time In/Out:   Time In: 1005  Time Out: 1100      PT Visit Info:         Visit Count:  8    OUTPATIENT PHYSICAL THERAPY:   Treatment Note 2024       Episode  (L ankle scope, peroneus brevis reconstruction, Broström)               Treatment Diagnosis:    Pain in left ankle and joints of left foot  Other instability, left ankle  Other abnormalities of gait and mobility  Medical/Referring Diagnosis:    Left ankle instability [M25.372]  Peroneal tendinitis of left lower extremity [M76.72]      Referring Physician:  Luly Baumann, APRN - CNP  MD Orders:  PT Eval and Treat; L Ankle and nerve desensitization  Return MD Appt:  24  Date of Onset:  Onset Date: 24 (L ankle scope, peroneus brevis reconstruction, Brostom procedure)     Allergies:   Pantoprazole  Restrictions/Precautions:   Post Surgical Precautions: L peroneus brevis reconstruction, Brostrom procedure      Interventions Planned (Treatment may consist of any combination of the following):     See Assessment Note    Subjective Comments:   Patient reports she was on her feet all day for her daughter's graduation party and it really caused a lot of pain and discomfort. She had to rest for 2 days.   Initial Pain Level::     7/10  Post Session Pain Level:       5/10  Medications Last Reviewed:  2024  Updated Objective Findings:   Slight increase in lateral edema, improved after elevated exercise; increased antalgia with

## 2024-05-10 ENCOUNTER — CLINICAL DOCUMENTATION (OUTPATIENT)
Dept: ORTHOPEDIC SURGERY | Age: 39
End: 2024-05-10

## 2024-05-14 ENCOUNTER — HOSPITAL ENCOUNTER (OUTPATIENT)
Dept: PHYSICAL THERAPY | Age: 39
Setting detail: RECURRING SERIES
Discharge: HOME | End: 2024-05-17
Payer: COMMERCIAL

## 2024-05-14 PROCEDURE — 97110 THERAPEUTIC EXERCISES: CPT

## 2024-05-14 ASSESSMENT — PAIN SCALES - GENERAL: PAINLEVEL_OUTOF10: 7

## 2024-05-21 ENCOUNTER — APPOINTMENT (OUTPATIENT)
Dept: PHYSICAL THERAPY | Age: 39
End: 2024-05-21
Payer: COMMERCIAL

## 2024-05-22 NOTE — PROGRESS NOTES
Lisset Pulido  : 1985  Primary: [facility] (Medicaid Managed)  Secondary:  [facility]  [address]  [facility] [address]  Phone: [phone]  Fax: [phone] Plan Frequency: 1-2x per week for 12 weeks    Plan of Care/Certification Expiration Date: 24        Plan of Care/Certification Expiration Date:  Plan of Care/Certification Expiration Date: 24    Frequency/Duration: Plan Frequency: 1-2x per week for 12 weeks      Time In/Out:   Time In: 958  Time Out: 1056      PT Visit Info:         Visit Count:  9    OUTPATIENT PHYSICAL THERAPY:   Treatment Note 2024       Episode  (L ankle scope, peroneus brevis reconstruction, Broström)               Treatment Diagnosis:    Pain in left ankle and joints of left foot  Other instability, left ankle  Other abnormalities of gait and mobility  Medical/Referring Diagnosis:    Left ankle instability [M25.372]  Peroneal tendinitis of left lower extremity [M76.72]      Referring Physician:  Luly Baumann, APRN - CNP  MD Orders:  PT Eval and Treat; L Ankle and nerve desensitization  Return MD Appt:  24  Date of Onset:  Onset Date: 24 (L ankle scope, peroneus brevis reconstruction, Brostom procedure)     Allergies:   Pantoprazole  Restrictions/Precautions:   Post Surgical Precautions: L peroneus brevis reconstruction, Brostrom procedure      Interventions Planned (Treatment may consist of any combination of the following):     See Assessment Note    Subjective Comments:   Patient reports she did alright over her beach trip. There were a lot of stairs and they got difficult as the week progressed. She was able to manage the sand as well but by the end of the week did have to rest in the condo and take a break from activity. She does admit to poor HEP adherence but she walked a lot.   Initial Pain Level::     6/10  Post Session Pain Level:        /10  Medications Last

## 2024-05-29 ENCOUNTER — HOSPITAL ENCOUNTER (OUTPATIENT)
Dept: PHYSICAL THERAPY | Age: 39
Setting detail: RECURRING SERIES
Discharge: HOME | End: 2024-06-01
Payer: COMMERCIAL

## 2024-05-29 PROCEDURE — 97110 THERAPEUTIC EXERCISES: CPT

## 2024-05-29 ASSESSMENT — PAIN SCALES - GENERAL: PAINLEVEL_OUTOF10: 6

## 2024-05-29 NOTE — PROGRESS NOTES
Lisset Pulido  : 1985  Primary: [facility] (Medicaid Managed)  Secondary:  [facility] [place]  [address]  [facility] [address]  Phone: [phone]  Fax: [phone] Plan Frequency: 1-2x per week for 12 weeks    Plan of Care/Certification Expiration Date: 24        Plan of Care/Certification Expiration Date:  Plan of Care/Certification Expiration Date: 24    Frequency/Duration: Plan Frequency: 1-2x per week for 12 weeks      Time In/Out:   Time In: 1528  Time Out: 1626      PT Visit Info:         Visit Count:  10    OUTPATIENT PHYSICAL THERAPY:   Treatment Note 6/3/2024       Episode  (L ankle scope, peroneus brevis reconstruction, Broström)               Treatment Diagnosis:    Pain in left ankle and joints of left foot  Other instability, left ankle  Other abnormalities of gait and mobility  Medical/Referring Diagnosis:    Left ankle instability [M25.372]  Peroneal tendinitis of left lower extremity [M76.72]      Referring Physician:  Luly Baumann, APRN - CNP  MD Orders:  PT Eval and Treat; L Ankle and nerve desensitization  Return MD Appt:  24  Date of Onset:  Onset Date: 24 (L ankle scope, peroneus brevis reconstruction, Brostom procedure)     Allergies:   Pantoprazole  Restrictions/Precautions:   Post Surgical Precautions: L peroneus brevis reconstruction, Brostrom procedure      Interventions Planned (Treatment may consist of any combination of the following):     See Assessment Note    Subjective Comments:   Patient reports she is scheduled to go back to work this weekend. She's nervous but thinks she ready.   Initial Pain Level::     3/10  Post Session Pain Level:       2/10  Medications Last Reviewed:  6/3/2024  Updated Objective Findings:   Improving balance and strength, still unable to perform single leg heel lift  Treatment   THERAPEUTIC EXERCISE: (55 minutes):    Exercises per grid below to improve

## 2024-06-03 ENCOUNTER — HOSPITAL ENCOUNTER (OUTPATIENT)
Dept: PHYSICAL THERAPY | Age: 39
Setting detail: RECURRING SERIES
Discharge: HOME | End: 2024-06-06
Payer: COMMERCIAL

## 2024-06-03 PROCEDURE — 97110 THERAPEUTIC EXERCISES: CPT

## 2024-06-03 ASSESSMENT — PAIN SCALES - GENERAL: PAINLEVEL_OUTOF10: 3

## 2024-06-07 ENCOUNTER — HOSPITAL ENCOUNTER (OUTPATIENT)
Dept: PHYSICAL THERAPY | Age: 39
Setting detail: RECURRING SERIES
Discharge: HOME | End: 2024-06-10
Payer: COMMERCIAL

## 2024-06-07 PROCEDURE — 97110 THERAPEUTIC EXERCISES: CPT

## 2024-06-07 ASSESSMENT — PAIN SCALES - GENERAL: PAINLEVEL_OUTOF10: 2

## 2024-06-07 NOTE — PROGRESS NOTES
Lisset Pulido  : 1985  Primary: [facility] (Medicaid Managed)  Secondary:  [facility] [place]  [address]  [facility] [address]  Phone: [phone]  Fax: [phone] Plan Frequency: 1-2x per week for 12 weeks    Plan of Care/Certification Expiration Date: 24        Plan of Care/Certification Expiration Date:  Plan of Care/Certification Expiration Date: 24    Frequency/Duration: Plan Frequency: 1-2x per week for 12 weeks      Time In/Out:   Time In: 1325  Time Out: 1424      PT Visit Info:         Visit Count:  11    OUTPATIENT PHYSICAL THERAPY:   Treatment Note 2024       Episode  (L ankle scope, peroneus brevis reconstruction, Broström)               Treatment Diagnosis:    Pain in left ankle and joints of left foot  Other instability, left ankle  Other abnormalities of gait and mobility  Medical/Referring Diagnosis:    Left ankle instability [M25.372]  Peroneal tendinitis of left lower extremity [M76.72]      Referring Physician:  Luly Baumann, APRN - CNP  MD Orders:  PT Eval and Treat; L Ankle and nerve desensitization  Return MD Appt:  24  Date of Onset:  Onset Date: 24 (L ankle scope, peroneus brevis reconstruction, Brostom procedure)     Allergies:   Pantoprazole  Restrictions/Precautions:   Post Surgical Precautions: L peroneus brevis reconstruction, Brostrom procedure      Interventions Planned (Treatment may consist of any combination of the following):     See Assessment Note    Subjective Comments:   Patients reports she's been doing a lot this week but is still nervous about starting work tomorrow.   Initial Pain Level::     2/10  Post Session Pain Level:       1/10  Medications Last Reviewed:  2024  Updated Objective Findings:   No antalgia upon start of session, improved gait speed  Treatment   THERAPEUTIC EXERCISE: (55 minutes):    Exercises per grid below to improve mobility, strength,

## 2024-06-07 NOTE — PROGRESS NOTES
malleolus  Treatment   THERAPEUTIC EXERCISE: (55 minutes):    Exercises per grid below to improve mobility, strength, balance, and coordination.  Required moderate visual, verbal, manual, and tactile cues to promote proper body alignment, promote proper body posture, and promote proper body mechanics.  Progressed resistance, range, repetitions, and complexity of movement as indicated.   Date:  6/3/24 Date:  6/7/24 Date:  6/10/24   Activity/Exercise Parameters Parameters Parameters   Ankle pumps -- -- --   Isometric Ankle strength -- -- --   Isotonic Ankle strength Blue tb inversion, eversion, plantarflexion, dorsiflexion x10 Blue tb inversion, eversion, plantarflexion, dorsiflexion x10 Blue tb inversion, eversion, plantarflexion, dorsiflexion x10   PF Strength Double leg heel raise 3x to fatigue    Trial isometric L heel raise hold- poor tolerance Double leg heel raise 1x to fatigue     Double leg heel raise 3x to fatigue       Foot intrinsic strength -- Elevated    Toe abduction x30    Toe curls x30 --   Passive ankle ROM -- Gentle stretch into DF, PF, eversion; gentle scar mobility Gentle stretch into DF, PF, eversion; gentle scar mobility   Ankle alphabet -- X2 elevated --   Gait training 5x500' in shoe (no brace), cueing for heel strike, even weightbearing 3X300' in shoe, cueing for heel strike, even weightbearing 5X500' in shoe (no brace), cueing for heel strike, even weightbearing   Standing balance NBOS EO/EC x2 mins    Tandem stance 2x2 mins each    SLS 2x1 min each    WB 2x2 mins NBOS EO/EC x2 mins    Tandem stance x2 mins each    SLS x1 min each    WB x2 mins NBOS EO/EC 2x2 mins    Tandem stance 2x2 mins each    SLS 2x1 min each    WB 2x2 mins       DF Mobility Standing gastroc stretch x2 mins    Self DF mob on step x3 mins    WB PF/DF x3 mins Standing gastroc stretch x2 mins    Self DF mob on step x2 mins    WB PF/DF x2 mins Standing gastroc stretch x2 mins    Self DF mob on step x2 mins    WB PF/DF x2

## 2024-06-07 NOTE — PROGRESS NOTES
PHYSICAL THERAPY   [facility] 6/7/2024      Patient did not show for appointment. Therapist left message requesting call to cancel if patient unable to make future appointments.        Lizeth Lopez PT, DPT, OCS

## 2024-06-10 ENCOUNTER — HOSPITAL ENCOUNTER (OUTPATIENT)
Dept: PHYSICAL THERAPY | Age: 39
Setting detail: RECURRING SERIES
Discharge: HOME | End: 2024-06-13
Payer: COMMERCIAL

## 2024-06-10 PROCEDURE — 97110 THERAPEUTIC EXERCISES: CPT

## 2024-06-10 ASSESSMENT — PAIN SCALES - GENERAL: PAINLEVEL_OUTOF10: 4

## 2024-06-11 NOTE — PROGRESS NOTES
Exercises per grid below to improve mobility, strength, balance, and coordination.  Required moderate visual, verbal, manual, and tactile cues to promote proper body alignment, promote proper body posture, and promote proper body mechanics.  Progressed resistance, range, repetitions, and complexity of movement as indicated.   Date:  6/14/24 Date:  6/7/24 Date:  6/10/24   Activity/Exercise Parameters Parameters Parameters   Ankle pumps -- -- --   Isometric Ankle strength -- -- --   Isotonic Ankle strength -- Blue tb inversion, eversion, plantarflexion, dorsiflexion x10 Blue tb inversion, eversion, plantarflexion, dorsiflexion x10   PF Strength Double leg heel raise 3x to fatigue    Isometric single leg heel raise 3x5s   *challenging but improved from last trial Double leg heel raise 1x to fatigue     Double leg heel raise 3x to fatigue       Foot intrinsic strength -- Elevated    Toe abduction x30    Toe curls x30 --   Passive ankle ROM -- Gentle stretch into DF, PF, eversion; gentle scar mobility Gentle stretch into DF, PF, eversion; gentle scar mobility   Ankle alphabet -- X2 elevated --   Gait training 5x500' in shoe (no brace), cueing for heel strike, even weightbearing 3X300' in shoe, cueing for heel strike, even weightbearing 5X500' in shoe (no brace), cueing for heel strike, even weightbearing   Standing balance NBOS EO/EC x2 mins    Tandem stance 2x2 mins each    SLS 2x1 min each    WB 2x2 mins    Tandem walking x200'    Slow high march x200' NBOS EO/EC x2 mins    Tandem stance x2 mins each    SLS x1 min each    WB x2 mins NBOS EO/EC 2x2 mins    Tandem stance 2x2 mins each    SLS 2x1 min each    WB 2x2 mins       DF Mobility Standing gastroc stretch x2 mins    Self DF mob on step x3 mins    WB PF/DF x3 mins Standing gastroc stretch x2 mins    Self DF mob on step x2 mins    WB PF/DF x2 mins Standing gastroc stretch x2 mins    Self DF mob on step x2 mins    WB PF/DF x2 mins   Cardio -- NuStep level 2 10 mins

## 2024-06-14 ENCOUNTER — HOSPITAL ENCOUNTER (OUTPATIENT)
Dept: PHYSICAL THERAPY | Age: 39
Setting detail: RECURRING SERIES
Discharge: HOME | End: 2024-06-17
Payer: COMMERCIAL

## 2024-06-14 PROCEDURE — 97110 THERAPEUTIC EXERCISES: CPT

## 2024-06-14 ASSESSMENT — PAIN SCALES - GENERAL: PAINLEVEL_OUTOF10: 1

## 2024-06-14 NOTE — PROGRESS NOTES
Lisset Pulido  : 1985  Primary: [facility] (Medicaid Managed)  Secondary:  [facility] [place]  [address]  [facility] [address]  Phone: [phone]  Fax: [phone] Plan Frequency: 1-2x per week for 12 weeks    Plan of Care/Certification Expiration Date: 24        Plan of Care/Certification Expiration Date:  Plan of Care/Certification Expiration Date: 24    Frequency/Duration: Plan Frequency: 1-2x per week for 12 weeks      Time In/Out:   Time In: 1427  Time Out: 1525      PT Visit Info:         Visit Count:  14    OUTPATIENT PHYSICAL THERAPY:   Treatment Note 2024       Episode  (L ankle scope, peroneus brevis reconstruction, Broström)               Treatment Diagnosis:    Pain in left ankle and joints of left foot  Other instability, left ankle  Other abnormalities of gait and mobility  Medical/Referring Diagnosis:    Left ankle instability [M25.372]  Peroneal tendinitis of left lower extremity [M76.72]      Referring Physician:  Luly Baumann, APRN - CNP  MD Orders:  PT Eval and Treat; L Ankle and nerve desensitization  Return MD Appt:  24  Date of Onset:  Onset Date: 24 (L ankle scope, peroneus brevis reconstruction, Brostom procedure)     Allergies:   Pantoprazole  Restrictions/Precautions:   Post Surgical Precautions: L peroneus brevis reconstruction, Brostrom procedure      Interventions Planned (Treatment may consist of any combination of the following):     See Assessment Note    Subjective Comments:   Patient reports 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 She did still have some pain/stiffness but overall did well. She also twisted her foot just before therapy putting her shoe on and it was pretty painful, but it's better now.   Initial Pain Level::     2/10  Post Session Pain Level:       0/10  Medications Last Reviewed:  2024  Updated

## 2024-06-17 ENCOUNTER — HOSPITAL ENCOUNTER (OUTPATIENT)
Dept: PHYSICAL THERAPY | Age: 39
Setting detail: RECURRING SERIES
Discharge: HOME | End: 2024-06-20
Payer: COMMERCIAL

## 2024-06-17 PROCEDURE — 97110 THERAPEUTIC EXERCISES: CPT

## 2024-06-17 ASSESSMENT — PAIN SCALES - GENERAL: PAINLEVEL_OUTOF10: 2

## 2024-06-18 NOTE — PROGRESS NOTES
Lisset Pulido  : 1985  Primary: [facility] (Medicaid Managed)  Secondary:  [facility]  [address]  [facility] [address]  Phone: [phone]  Fax: [phone] Plan Frequency: 1-2x per week for 12 weeks    Plan of Care/Certification Expiration Date: 24        Plan of Care/Certification Expiration Date:  Plan of Care/Certification Expiration Date: 24    Frequency/Duration: Plan Frequency: 1-2x per week for 12 weeks      Time In/Out:   Time In: 906  Time Out: 1003      PT Visit Info:         Visit Count:  15    OUTPATIENT PHYSICAL THERAPY:   Treatment Note 2024       Episode  (L ankle scope, peroneus brevis reconstruction, Broström)               Treatment Diagnosis:    Pain in left ankle and joints of left foot  Other instability, left ankle  Other abnormalities of gait and mobility  Medical/Referring Diagnosis:    Left ankle instability [M25.372]  Peroneal tendinitis of left lower extremity [M76.72]      Referring Physician:  Luly Baumann, APRN - CNP  MD Orders:  PT Eval and Treat; L Ankle and nerve desensitization  Return MD Appt:  24  Date of Onset:  Onset Date: 24 (L ankle scope, peroneus brevis reconstruction, Brostom procedure)     Allergies:   Pantoprazole  Restrictions/Precautions:   Post Surgical Precautions: L peroneus brevis reconstruction, Brostrom procedure      Interventions Planned (Treatment may consist of any combination of the following):     See Assessment Note    Subjective Comments:   Patient reports she had orientation at [employer] with her daughter and she is very tired/sore. She was able to do a lot of walking and a lot of steps. She is scheduled to work tomorrow and .   Initial Pain Level::     3/10  Post Session Pain Level:       /10  Medications Last Reviewed:  2024  Updated Objective Findings:   Small pocket of swelling proximal to lateral malleolus  Treatment

## 2024-06-20 ENCOUNTER — HOSPITAL ENCOUNTER (OUTPATIENT)
Dept: PHYSICAL THERAPY | Age: 39
Setting detail: RECURRING SERIES
Discharge: HOME | End: 2024-06-23
Payer: COMMERCIAL

## 2024-06-20 PROCEDURE — 97110 THERAPEUTIC EXERCISES: CPT

## 2024-06-20 ASSESSMENT — PAIN SCALES - GENERAL: PAINLEVEL_OUTOF10: 3

## 2024-06-20 NOTE — PROGRESS NOTES
THERAPEUTIC EXERCISE: (55 minutes):    Exercises per grid below to improve mobility, strength, balance, and coordination.  Required moderate visual, verbal, manual, and tactile cues to promote proper body alignment, promote proper body posture, and promote proper body mechanics.  Progressed resistance, range, repetitions, and complexity of movement as indicated.   Date:  6/24/24 Date:  6/17/24 Date:  6/21/24   Activity/Exercise Parameters Parameters Parameters   Ankle pumps -- -- --   Isometric Ankle strength -- -- --   Isotonic Ankle strength 6# on shuttle PF LLE only 3x10 6# on shuttle PF LLE only 3x10 6# on shuttle PF LLE only 3x10   PF Strength Double leg heel raise 3x to fatigue    Isometric single leg heel raise 3x5s  Double leg heel raise 1x to fatigue    Isometric single leg heel raise 3x5s    Double leg heel raise 1x to fatigue    Isometric single leg heel raise 3x5s        Foot intrinsic strength -- -- --   Passive ankle ROM -- -- --   Ankle alphabet -- -- --   Gait training 5x500' in shoe (no brace), cueing for heel strike, even weightbearing 3X300' in shoe, cueing for heel strike, even weightbearing 5X500' in shoe (no brace), cueing for heel strike, even weightbearing   Standing balance SLS 2x1 min each    WB 2x2 mins    Tandem walking x200'   Tandem stance 2x2 mins each    SLS 2x1 min each    WB 2x2 mins    Tandem walking x200'    Slow high march x200'    Side step in squat x200' Tandem stance 2x2 mins each    SLS 2x1 min each    WB 2x2 mins    Tandem walking x200'    Slow high march x200'    Side step in squat x200'       DF Mobility Standing gastroc stretch x2 mins    Self DF mob on step x3 mins    WB PF/DF x3 mins Standing gastroc stretch x2 mins    Self DF mob on step x2 mins    WB PF/DF x2 mins Standing gastroc stretch x2 mins    Self DF mob on step x2 mins    WB PF/DF x2 mins   Cardio NuStep level 2 10 mins NuStep level 2 10 mins NuStep level 2 10 mins   LE functional strength -- -- 5# goblet

## 2024-06-24 ENCOUNTER — HOSPITAL ENCOUNTER (OUTPATIENT)
Dept: PHYSICAL THERAPY | Age: 39
Setting detail: RECURRING SERIES
Discharge: HOME | End: 2024-06-27
Payer: COMMERCIAL

## 2024-06-24 PROCEDURE — 97110 THERAPEUTIC EXERCISES: CPT

## 2024-06-24 ASSESSMENT — PAIN SCALES - GENERAL: PAINLEVEL_OUTOF10: 2

## 2024-06-24 NOTE — PROGRESS NOTES
Lisset Pulido  : 1985  Primary: [facility] (Medicaid Managed)  Secondary:  [facility] [place]  [address]  [facility] [address]  Phone: [phone]  Fax: [phone] Plan Frequency: 1-2x per week for 12 weeks    Plan of Care/Certification Expiration Date: 24        Plan of Care/Certification Expiration Date:  Plan of Care/Certification Expiration Date: 24    Frequency/Duration: Plan Frequency: 1-2x per week for 12 weeks      Time In/Out:   Time In: 1224  Time Out: 1320      PT Visit Info:    Progress Note Counter:       Visit Count:  17    OUTPATIENT PHYSICAL THERAPY:   Treatment Note 2024       Episode  (L ankle scope, peroneus brevis reconstruction, Broström)               Treatment Diagnosis:    Pain in left ankle and joints of left foot  Other instability, left ankle  Other abnormalities of gait and mobility  Medical/Referring Diagnosis:    Left ankle instability [M25.372]  Peroneal tendinitis of left lower extremity [M76.72]      Referring Physician:  Luly Baumann, APRN - CNP  MD Orders:  PT Eval and Treat; L Ankle and nerve desensitization  Return MD Appt:  24  Date of Onset:  Onset Date: 24 (L ankle scope, peroneus brevis reconstruction, Brostom procedure)     Allergies:   Pantoprazole  Restrictions/Precautions:   Post Surgical Precautions: L peroneus brevis reconstruction, Brostrom procedure      Interventions Planned (Treatment may consist of any combination of the following):     See Assessment Note    Subjective Comments:   Sade reports overall she is doing great, just has one spot of swelling/pain that seems to persist. She feels 95% of the way there.   Initial Pain Level::     1/10  Post Session Pain Level:       1/10  Medications Last Reviewed:  2024  Updated Objective Findings:   See Recertification Note from today  Treatment   THERAPEUTIC EXERCISE: (54 minutes):    Exercises per

## 2024-06-28 ENCOUNTER — HOSPITAL ENCOUNTER (OUTPATIENT)
Dept: PHYSICAL THERAPY | Age: 39
Setting detail: RECURRING SERIES
Discharge: HOME | End: 2024-07-01
Payer: COMMERCIAL

## 2024-06-28 PROCEDURE — 97110 THERAPEUTIC EXERCISES: CPT

## 2024-06-28 ASSESSMENT — PAIN SCALES - GENERAL: PAINLEVEL_OUTOF10: 1

## 2024-07-02 NOTE — PROGRESS NOTES
shuttle 81# 2x15     Time spent with patient reviewing proper muscle recruitment and technique with exercises.     MODALITIES: (0 minutes):        None today      HEP: As above; handouts given to patient for all exercises.      Treatment/Session Summary:    Treatment Assessment:   Patient tolerates session very well, is making continued gains in strength and activity tolerance. Plan for likely DC over the next 2-3 weeks.   Communication/Consultation:  None today  Equipment provided today:  HEP  Recommendations/Intent for next treatment session: Next visit will focus on graded motor imagery progression, gentle ankle ROM/strength, progression in weightbearing.    >Total Treatment Billable Duration:  54 minutes  Time In: 1429  Time Out: 1526    Lizeth Lopez PT         Charge Capture  [employer] Portal  Appt Desk     Future Appointments   Date Time Provider Department Center   7/16/2024 10:00 AM Lizeth Lopez PT [facility]   7/23/2024  9:00 AM Lizeth Lopez PT [facility]   7/30/2024  9:00 AM Lizeth Lopez PT [facility]   8/14/2024  8:40 AM Luly Baumann, APRN - CNP [facility]

## 2024-07-08 ENCOUNTER — HOSPITAL ENCOUNTER (OUTPATIENT)
Dept: PHYSICAL THERAPY | Age: 39
Setting detail: RECURRING SERIES
Discharge: HOME | End: 2024-07-11
Payer: COMMERCIAL

## 2024-07-08 PROCEDURE — 97110 THERAPEUTIC EXERCISES: CPT

## 2024-07-08 ASSESSMENT — PAIN SCALES - GENERAL: PAINLEVEL_OUTOF10: 1

## 2024-08-02 ENCOUNTER — OFFICE VISIT (OUTPATIENT)
Dept: ORTHOPEDIC SURGERY | Age: 39
End: 2024-08-02
Payer: COMMERCIAL

## 2024-08-02 DIAGNOSIS — M76.72 PERONEAL TENDINITIS OF LEFT LOWER EXTREMITY: ICD-10-CM

## 2024-08-02 DIAGNOSIS — M25.572 LEFT LATERAL ANKLE PAIN: ICD-10-CM

## 2024-08-02 DIAGNOSIS — M25.372 LEFT ANKLE INSTABILITY: Primary | ICD-10-CM

## 2024-08-02 PROCEDURE — 99214 OFFICE O/P EST MOD 30 MIN: CPT | Performed by: NURSE PRACTITIONER

## 2024-08-02 RX ORDER — METHYLPREDNISOLONE 4 MG/1
TABLET ORAL
Qty: 1 KIT | Refills: 1 | Status: SHIPPED | OUTPATIENT
Start: 2024-08-02 | End: 2024-08-08

## 2024-08-02 NOTE — PROGRESS NOTES
Name: Lisset Pulido  YOB: 1985  Gender: female  MRN: [id number]    Procedure Performed:  Left ankle arthroscopy with extensive debridement  Left peroneus brevis tendon reconstruction  Left lateral ankle ligament reconstruction           Date of Procedure: 02/06/2024      Subjective: Patient reports that over the course of the past couple weeks that the lateral ankle has really begun to bother her more so than in the previous month since her surgery.  She notes she can barely get through a 12-hour shift on her feet.  She does report wearing the lace up ankle brace when she works.  She has been going to therapy for strengthening and increasing mobility.  She does not report having injured the ankle however she does think that during her recent work shift that she may have stepped wrong and that is when everything flared up.      Physical Examination: All incisional areas well-healed.  Overall today the patient swelling looks pretty good, it is definitely decreased since her last visit.  She does have pain with palpation and is point tender over the peroneal tendons at the posterior ankle as well as to the sinus Tarsi region of the foot.  She is not quite physically able to bear weight on the affected extremity with a double leg toe raise.  The ankle joint is stable through talar tilt and anterior drawer testing.  She was tender to the talar tilt test and especially with inverting the foot.        Imaging:   Interpretation of imaging  Left ankle XR: AP, Lateral, Oblique views     ICD-10-CM    1. Left lateral ankle pain  M25.572 XR ANKLE LEFT (MIN 3 VIEWS)         Report: AP, lateral, oblique x-ray of the left ankle demonstrates no acute findings or fractures   Impression: No acute findings or fractures   Luly Baumann, APRN - CNP           Assessment:   Status post left ankle arthroscopy with peroneus brevis tendon reconstruction and lateral ankle ligament reconstruction.  We will try

## 2024-08-06 NOTE — PROGRESS NOTES
Lisset Pulido  : 1985  Primary: [facility] (Medicaid Managed)  Secondary:  [facility] [place]  [address]  [facility] [address]  Phone: [phone]  Fax: [phone] Plan Frequency: 1x every 2-3 weeks    Plan of Care/Certification Expiration Date: 10/20/24        Plan of Care/Certification Expiration Date:  Plan of Care/Certification Expiration Date: 10/20/24    Frequency/Duration: Plan Frequency: 1x every 2-3 weeks      Time In/Out:   Time In: 1324  Time Out: 1421      PT Visit Info:         Visit Count:  1    OUTPATIENT PHYSICAL THERAPY:   Treatment Note 2024       Episode  (L Ankle)               Treatment Diagnosis:    Pain in left ankle and joints of left foot  Other abnormalities of gait and mobility  Stiffness of left ankle, not elsewhere classified  Medical/Referring Diagnosis:    Left lateral ankle pain [M25.572]    Referring Physician:  Luly Baumann, WHITLEY - CNP  MD Orders:  PT Eval and Treat   Return MD Appt:  24   Date of Onset:  Onset Date: 24 (L Peroneus Brevis Reconstruction; Brostrom Procedure)     Allergies:   Pantoprazole  Restrictions/Precautions:   L Peroneus Brevis Reconstruction; Brostrom Procedure      Interventions Planned (Treatment may consist of any combination of the following):     See Assessment Note    Subjective Comments:   Patient reports her pain has been limiting her more than anything else lately.   Initial Pain Level::     5/10  Post Session Pain Level:       4/10  Medications Last Reviewed:  2024  Updated Objective Findings:  See Evaluation Note from today  Treatment   THERAPEUTIC EXERCISE: (40 minutes):    Exercises per grid below to improve mobility, strength, balance, and coordination.  Required moderate visual, verbal, manual, and tactile cues to promote proper body alignment, promote proper body posture, and promote proper body mechanics.  Progressed resistance, range,

## 2024-08-06 NOTE — THERAPY EVALUATION
Lisset Pulido  : 1985  Primary: [facility] (Medicaid Managed)  Secondary:  [facility] [place]  [address]  [facility] [address]  Phone: [phone]  Fax: [phone] Plan Frequency: 1x every 2-3 weeks    Plan of Care/Certification Expiration Date: 10/20/24        Plan of Care/Certification Expiration Date:  Plan of Care/Certification Expiration Date: 10/20/24    Frequency/Duration: Plan Frequency: 1x every 2-3 weeks      Time In/Out:   Time In: 1324  Time Out: 1421      PT Visit Info:         Visit Count:  1                OUTPATIENT PHYSICAL THERAPY:             Initial Assessment 2024               Episode (L Ankle)         Treatment Diagnosis:    Pain in left ankle and joints of left foot  Other abnormalities of gait and mobility  Stiffness of left ankle, not elsewhere classified  Medical/Referring Diagnosis:    Left lateral ankle pain [M25.572]    Referring Physician:  Luly Baumann, WHITLEY - CNP  MD Orders:  PT Eval and Treat   Return MD Appt:  24  Date of Onset:  Onset Date: 24 (L Peroneus Brevis Reconstruction; Brostrom Procedure)    Allergies:  Pantoprazole  Restrictions/Precautions:    L Peroneus Brevis Reconstruction; Brostrom Procedure      Medications Last Reviewed:  2024     SUBJECTIVE   History of Injury/Illness (Reason for Referral):  Patient reports her ankle was doing well but a few weeks ago at work she took an odd step while avoiding a coworker through a doorway and felt a pull in her ankle. She isn't sure if she injured anything but that's the only instance she can think of to explain her increase in pain. She notes she has been working 2-3 shifts per week and does well the first part of the shift but it becomes very painful as the shift goes on. She has been consistent with her HEP, using the wobble board and working on strengthening daily. She notes she is not at full strength but does feel

## 2024-08-07 ENCOUNTER — HOSPITAL ENCOUNTER (OUTPATIENT)
Dept: PHYSICAL THERAPY | Age: 39
Setting detail: RECURRING SERIES
Discharge: HOME | End: 2024-08-10
Payer: COMMERCIAL

## 2024-08-07 ENCOUNTER — APPOINTMENT (OUTPATIENT)
Dept: PHYSICAL THERAPY | Age: 39
End: 2024-08-07
Payer: COMMERCIAL

## 2024-08-07 DIAGNOSIS — M25.672 STIFFNESS OF LEFT ANKLE, NOT ELSEWHERE CLASSIFIED: ICD-10-CM

## 2024-08-07 DIAGNOSIS — M25.572 PAIN IN LEFT ANKLE AND JOINTS OF LEFT FOOT: Primary | ICD-10-CM

## 2024-08-07 DIAGNOSIS — R26.89 OTHER ABNORMALITIES OF GAIT AND MOBILITY: ICD-10-CM

## 2024-08-07 PROCEDURE — 97161 PT EVAL LOW COMPLEX 20 MIN: CPT

## 2024-08-07 PROCEDURE — 97110 THERAPEUTIC EXERCISES: CPT

## 2024-08-07 ASSESSMENT — PAIN SCALES - GENERAL: PAINLEVEL_OUTOF10: 5

## 2024-09-09 ENCOUNTER — OFFICE VISIT (OUTPATIENT)
Dept: ORTHOPEDIC SURGERY | Age: 39
End: 2024-09-09

## 2024-09-09 ENCOUNTER — CLINICAL DOCUMENTATION (OUTPATIENT)
Dept: ORTHOPEDIC SURGERY | Age: 39
End: 2024-09-09

## 2024-09-09 DIAGNOSIS — M76.72 PERONEAL TENDINITIS OF LEFT LOWER EXTREMITY: ICD-10-CM

## 2024-09-09 DIAGNOSIS — M25.372 LEFT ANKLE INSTABILITY: Primary | ICD-10-CM

## 2024-09-09 PROCEDURE — 99024 POSTOP FOLLOW-UP VISIT: CPT | Performed by: NURSE PRACTITIONER

## 2024-12-16 ENCOUNTER — OFFICE VISIT (OUTPATIENT)
Dept: ORTHOPEDIC SURGERY | Age: 39
End: 2024-12-16

## 2024-12-16 DIAGNOSIS — M25.372 LEFT ANKLE INSTABILITY: Primary | ICD-10-CM

## 2024-12-16 DIAGNOSIS — M76.72 PERONEAL TENDINITIS OF LEFT LOWER EXTREMITY: ICD-10-CM

## 2024-12-16 DIAGNOSIS — M25.572 LEFT LATERAL ANKLE PAIN: ICD-10-CM

## 2024-12-16 RX ORDER — METHYLPREDNISOLONE ACETATE 40 MG/ML
40 INJECTION, SUSPENSION INTRA-ARTICULAR; INTRALESIONAL; INTRAMUSCULAR; SOFT TISSUE ONCE
Status: COMPLETED | OUTPATIENT
Start: 2024-12-16 | End: 2024-12-16

## 2024-12-16 RX ADMIN — METHYLPREDNISOLONE ACETATE 40 MG: 40 INJECTION, SUSPENSION INTRA-ARTICULAR; INTRALESIONAL; INTRAMUSCULAR; SOFT TISSUE at 10:51

## 2024-12-16 NOTE — PROGRESS NOTES
Name: Lisset Pulido  YOB: 1985  Gender: female  MRN: [id number]    Summary:     Left lateral ankle instability with probable lateral ankle impingement     CC: Follow-up (Left ankle/foot xrays obtained in office today )       HPI: Lisset Pulido is a 39 y.o. female who presents with Follow-up (Left ankle/foot xrays obtained in office today )  .  This patient presents the office today now about 10 months out from a left ankle ligament reconstruction with peroneus brevis tendon reconstruction.  She was doing pretty well and if she is got increasingly active has noticed increasing command chemical type symptoms in the lateral aspect of her ankle as well as a giving way sensation.  She did have a fall in her kitchen as a result.    History was obtained by Patient     ROS/Meds/PSH/PMH/FH/SH: I personally reviewed the patients standard intake form.  Below are the pertinents    Tobacco:  reports that she has never smoked. She has never used smokeless tobacco.  Diabetes: None      Physical Examination:  Exam of left lower extremity shows well-healed surgical incision.  There is no fullness or pain over the peroneal tendons.  She has good stability talar tilt testing and her drawer testing.  She does have pain over the lateral aspect of the joint.      Imaging:   Interpretation of imaging  Left ankle and foot XR: AP, Lateral, Oblique views     ICD-10-CM    1. Left ankle instability  M25.372 XR ANKLE LEFT (MIN 3 VIEWS)     XR FOOT LEFT (2 VIEWS)     methylPREDNISolone acetate (DEPO-MEDROL) injection 40 mg     DRAIN/INJECT INTERMEDIATE JOINT/BURSA     MRI ANKLE LEFT WO CONTRAST      2. Peroneal tendinitis of left lower extremity  M76.72 XR ANKLE LEFT (MIN 3 VIEWS)     XR FOOT LEFT (2 VIEWS)     methylPREDNISolone acetate (DEPO-MEDROL) injection 40 mg     DRAIN/INJECT INTERMEDIATE JOINT/BURSA     MRI ANKLE LEFT WO CONTRAST      3. Left lateral ankle pain  M25.572 XR ANKLE LEFT (MIN 3 VIEWS)

## 2024-12-24 ENCOUNTER — TELEPHONE (OUTPATIENT)
Dept: ORTHOPEDIC SURGERY | Age: 39
End: 2024-12-24

## 2024-12-24 NOTE — TELEPHONE ENCOUNTER
MRI LEFT ANKLE APPROVAL     Case   Case Description: Ankle MRI (left) Request ID:  Tracking: [id number]  [id number]         Request Date: 12/18/2024 02:36 PM Status: Approved   Entry Method: RadMD Validity Dates: 12/18/2024-1/17/2025    ICD10: M25.372, M25.572, M76.72  Contact Name: parth james  (Referring Provider)   Initial Determination Date: 12/18/2024 02:49 PM Email: [email or web address]   Final Determination Date: 12/18/2024 02:49 PM       Please be advised that all data was current as of Tuesday, December 24, 2024 at 10:20 AM MST   Radiology   Date of Service: 1/13/2025   Update       Expedited: No   Extension: No   CPT4: 04773 Billable Codes   Clinical Rcvd: 12/18/2024 - Clinical information received via fax or upload

## 2025-02-03 ENCOUNTER — OFFICE VISIT (OUTPATIENT)
Dept: ORTHOPEDIC SURGERY | Age: 40
End: 2025-02-03
Payer: COMMERCIAL

## 2025-02-03 DIAGNOSIS — M76.72 PERONEAL TENDINITIS OF LEFT LOWER EXTREMITY: ICD-10-CM

## 2025-02-03 DIAGNOSIS — M25.372 LEFT ANKLE INSTABILITY: Primary | ICD-10-CM

## 2025-02-03 PROCEDURE — 99214 OFFICE O/P EST MOD 30 MIN: CPT | Performed by: ORTHOPAEDIC SURGERY

## 2025-02-03 NOTE — PROGRESS NOTES
Name: Lisset Pulido  YOB: 1985  Gender: female  MRN: [id number]    Summary:     Left ankle instability     CC: Follow-up (LEFT ANKLE MRI F/U/Left lateral ankle instability with probable lateral ankle impingement)       HPI: Lisset Pulido is a 39 y.o. female who presents with Follow-up (LEFT ANKLE MRI F/U/Left lateral ankle instability with probable lateral ankle impingement)  .  This patient presents back to the office today for follow-up of her left ankle ligament reconstruction.  She had an injection performed got no relief from this in terms of impingement.  She had a subsequent fall before her recent MRI that she is also here to discuss.    History was obtained by Patient     ROS/Meds/PSH/PMH/FH/SH: I personally reviewed the patients standard intake form.  Below are the pertinents    Tobacco:  reports that she has never smoked. She has never used smokeless tobacco.  Diabetes: None      Physical Examination:    Exam of the left lower extremity shows no tenderness over the portals.  She has good stability talar tilt testing and intra drawer testing.  There is mild tenderness over the inferior fibular region of the peroneal tendons but no sign of swelling or tear is noted    Imaging:   I independently interpreted the MRI I ordered of the of the left ankle which shows intact repair with no evidence of soft tissue impingement           HENRRY PRICE III, MD           Assessment:   Left lateral ankle instability    Treatment Plan:   4 This is a chronic illness/condition with exacerbation and progression  Treatment at this time: Time with no intervention  Studies ordered: NO XR needed @ Next Visit    Weight-bearing status: WBAT        Return to work/work restrictions: none  No medications given    Her psychiatrist has put her on Cymbalta which is really helped a lot with her ankle as well.  I told her I thought this was a great idea.  The MRI does show a possible split tear of the

## (undated) DEVICE — 2000CC GUARDIAN II: Brand: GUARDIAN

## (undated) DEVICE — SYRINGE MED 30ML STD CLR PLAS LUERLOCK TIP N CTRL DISP

## (undated) DEVICE — ZIMMER® STERILE DISPOSABLE TOURNIQUET CUFF WITH PLC, DUAL PORT, SINGLE BLADDER, 18 IN. (46 CM)

## (undated) DEVICE — SUTURE VCRL SZ 2-0 L27IN ABSRB UD L26MM CT-2 1/2 CIR J269H

## (undated) DEVICE — DISSECTOR ENDOSCP L7MM DIA3MM FOR RESECT SM JT COOLCUT

## (undated) DEVICE — REM POLYHESIVE ADULT PATIENT RETURN ELECTRODE: Brand: VALLEYLAB

## (undated) DEVICE — SPONGE: SPECIALTY TONSIL XR MED 100/CS: Brand: MEDICAL ACTION INDUSTRIES

## (undated) DEVICE — INTENDED FOR TISSUE SEPARATION, AND OTHER PROCEDURES THAT REQUIRE A SHARP SURGICAL BLADE TO PUNCTURE OR CUT.: Brand: BARD-PARKER ® STAINLESS STEEL BLADES

## (undated) DEVICE — JELLY LUBRICATING 10GM PREFIL SYR LUBE

## (undated) DEVICE — BUTTON SWITCH PENCIL BLADE ELECTRODE, HOLSTER: Brand: EDGE

## (undated) DEVICE — INSULATED BLADE ELECTRODE: Brand: EDGE

## (undated) DEVICE — NEEDLE SPNL L3.5IN PNK HUB S STL REG WALL FIT STYL W/ QNCKE

## (undated) DEVICE — FOOT DR TOLLISON & WOMACK: Brand: MEDLINE INDUSTRIES, INC.

## (undated) DEVICE — ELECTROSURGICAL SUCTION COAGULATOR 10FR

## (undated) DEVICE — SOLUTION IV 1000ML 0.9% SOD CHL

## (undated) DEVICE — KIT,ANTI FOG,W/SPONGE & FLUID,SOFT PACK: Brand: MEDLINE

## (undated) DEVICE — KIT PROCEDURE SURG T AND A ORAL TOTE

## (undated) DEVICE — GLOVE SURG SZ 8 L12IN FNGR THK79MIL GRN LTX FREE

## (undated) DEVICE — VINYL URETHRAL CATHETER: Brand: DOVER

## (undated) DEVICE — NEEDLE HYPO 18GA L1.5IN PNK S STL HUB POLYPR SHLD REG BVL

## (undated) DEVICE — TUBING, SUCTION, 1/4" X 10', STRAIGHT: Brand: MEDLINE

## (undated) DEVICE — GLOVE SURG SZ 65 L12IN FNGR THK79MIL GRN LTX FREE

## (undated) DEVICE — GOWN,SIRUS,NONRNF,SETINSLV,XL,20/CS: Brand: MEDLINE

## (undated) DEVICE — KIT INSTR DRL GUID 1.6/1.35MM GUIDWIRE DISP FIBERTAK DX

## (undated) DEVICE — TUBING PMP L16FT MAIN DISP FOR AR-6400 AR-6475

## (undated) DEVICE — SOLUTION IRRIG 1000ML 0.9% SOD CHL USP POUR PLAS BTL

## (undated) DEVICE — YANKAUER,BULB TIP,W/O VENT,RIGID,STERILE: Brand: MEDLINE

## (undated) DEVICE — DRAPE C ARM W54XL84IN MINI FOR OEC 6800

## (undated) DEVICE — SPLINT THMB W4XL30IN FBRGLS PD PRECUT LTWT DURABLE FAST SET

## (undated) DEVICE — SOLUTION IRRIG 3000ML 0.9% SOD CHL USP UROMATIC PLAS CONT

## (undated) DEVICE — FOAM BUMP ROUND LARGE: Brand: MEDLINE INDUSTRIES, INC.

## (undated) DEVICE — GOWN,PREVENTION PLUS,XLN/XL,ST,24/CS: Brand: MEDLINE

## (undated) DEVICE — GLOVE SURG SZ 65 CRM LTX FREE POLYISOPRENE POLYMER BEAD ANTI